# Patient Record
Sex: FEMALE | Race: WHITE | NOT HISPANIC OR LATINO | Employment: STUDENT | URBAN - METROPOLITAN AREA
[De-identification: names, ages, dates, MRNs, and addresses within clinical notes are randomized per-mention and may not be internally consistent; named-entity substitution may affect disease eponyms.]

---

## 2017-01-23 ENCOUNTER — OFFICE VISIT (OUTPATIENT)
Dept: FAMILY MEDICINE CLINIC | Facility: CLINIC | Age: 22
End: 2017-01-23

## 2017-01-23 VITALS
TEMPERATURE: 98 F | DIASTOLIC BLOOD PRESSURE: 72 MMHG | RESPIRATION RATE: 16 BRPM | OXYGEN SATURATION: 96 % | HEART RATE: 80 BPM | BODY MASS INDEX: 24.99 KG/M2 | WEIGHT: 150 LBS | SYSTOLIC BLOOD PRESSURE: 110 MMHG | HEIGHT: 65 IN

## 2017-01-23 DIAGNOSIS — F98.8 ADD (ATTENTION DEFICIT DISORDER): ICD-10-CM

## 2017-01-23 PROCEDURE — 99213 OFFICE O/P EST LOW 20 MIN: CPT | Performed by: FAMILY MEDICINE

## 2017-01-23 RX ORDER — HYDROCODONE BITARTRATE AND ACETAMINOPHEN 7.5; 325 MG/1; MG/1
TABLET ORAL
Refills: 0 | COMMUNITY
Start: 2017-01-06 | End: 2017-01-23

## 2017-01-23 RX ORDER — DEXTROAMPHETAMINE SACCHARATE, AMPHETAMINE ASPARTATE, DEXTROAMPHETAMINE SULFATE AND AMPHETAMINE SULFATE 1.25; 1.25; 1.25; 1.25 MG/1; MG/1; MG/1; MG/1
5 TABLET ORAL 2 TIMES DAILY
Qty: 60 TABLET | Refills: 0 | Status: SHIPPED | OUTPATIENT
Start: 2017-01-23 | End: 2017-03-20 | Stop reason: SDUPTHER

## 2017-01-23 RX ORDER — DEXTROAMPHETAMINE SACCHARATE, AMPHETAMINE ASPARTATE MONOHYDRATE, DEXTROAMPHETAMINE SULFATE AND AMPHETAMINE SULFATE 2.5; 2.5; 2.5; 2.5 MG/1; MG/1; MG/1; MG/1
10 CAPSULE, EXTENDED RELEASE ORAL EVERY MORNING
Qty: 30 CAPSULE | Refills: 0 | Status: CANCELLED | OUTPATIENT
Start: 2017-01-23

## 2017-01-23 RX ORDER — IBUPROFEN 600 MG/1
TABLET ORAL
Refills: 0 | COMMUNITY
Start: 2017-01-06 | End: 2017-01-23

## 2017-01-23 RX ORDER — NORGESTIMATE AND ETHINYL ESTRADIOL 7DAYSX3 LO
1 KIT ORAL
COMMUNITY
Start: 2016-05-16 | End: 2017-01-23

## 2017-01-23 RX ORDER — AMOXICILLIN 500 MG/1
CAPSULE ORAL
Refills: 0 | COMMUNITY
Start: 2017-01-06 | End: 2017-01-23

## 2017-01-23 NOTE — PROGRESS NOTES
"Subjective   Adina Trinh is a 21 y.o. female.     History of Present Illness   Chief Complaint:   Chief Complaint   Patient presents with   • LALO Trinh 21 y.o. female who presents today for Medical Management of the below listed issues and medication refills. The patient has read and signed the [facility] Controlled Substance Contract.  I will continue to see patient for regular follow up appointments.  They are well controlled on their medication.  MARISSA has been reviewed by me and is updated every 3 months. Only taking 2 classes and wants to decrease to 5mg bid. The patient is aware of the potential for addiction and dependence.    she has a history of   Patient Active Problem List   Diagnosis   • ADD (attention deficit disorder)   • Depression   • Herpes simplex of female genitalia   • Herpes simplex type 1 infection   .  Since the last visit, she has overall felt well.  she has been compliant with   Current Outpatient Prescriptions:   •  norgestimate-ethinyl estradiol (ORTHO TRI-CYCLEN LO) 0.18/0.215/0.25 MG-25 MCG per tablet, Take 1 tablet by mouth., Disp: , Rfl:   •  amoxicillin (AMOXIL) 500 MG capsule, TK ONE C PO  TID UNTIL GONE, Disp: , Rfl: 0  •  amphetamine-dextroamphetamine XR (ADDERALL XR) 10 MG 24 hr capsule, Take 1 capsule by mouth Every Morning., Disp: 30 capsule, Rfl: 0  •  HYDROcodone-acetaminophen (NORCO) 7.5-325 MG per tablet, TK 1 T PO  Q 6 HOURS PRN FOR PAIN, Disp: , Rfl: 0  •  ibuprofen (ADVIL,MOTRIN) 600 MG tablet, TK 1 T PO  Q 6 HOURS PRN FOR PAIN, Disp: , Rfl: 0  •  valACYclovir (VALTREX) 1000 MG tablet, Take 1 tablet by mouth 2 (Two) Times a Day., Disp: 60 tablet, Rfl: 0.  she denies medication side effects.    All of the chronic condition(s) listed above are stable w/o issues.    Visit Vitals   • /72   • Pulse 80   • Temp 98 °F (36.7 °C) (Oral)   • Resp 16   • Ht 65\" (165.1 cm)   • Wt 150 lb (68 kg)   • SpO2 96%   • BMI 24.96 kg/m2       Results for orders " placed or performed in visit on 02/18/16   Written Authorization   Result Value Ref Range    Written Authorization Comment    HSV 1 and 2 IgM, Abs, Indirect   Result Value Ref Range    HSV 1 IgM <1:10 <1:10 titer    HSV 2 IgM <1:10 <1:10 titer         The following portions of the patient's history were reviewed and updated as appropriate: allergies, current medications, past family history, past medical history, past social history, past surgical history and problem list.    Review of Systems   Constitutional: Negative for activity change, appetite change and unexpected weight change.   Eyes: Negative for visual disturbance.   Respiratory: Negative for chest tightness and shortness of breath.    Cardiovascular: Negative for chest pain and palpitations.   Skin: Negative for color change.   Neurological: Negative for syncope and speech difficulty.   Psychiatric/Behavioral: Negative for confusion and decreased concentration.       Objective   Physical Exam   Constitutional: She appears well-developed and well-nourished.   Eyes: Pupils are equal, round, and reactive to light.   Cardiovascular: Normal rate and regular rhythm.    Pulmonary/Chest: Effort normal and breath sounds normal.   Psychiatric: She has a normal mood and affect. Her behavior is normal. Judgment and thought content normal.   Nursing note and vitals reviewed.      Assessment/Plan   Adina was seen today for add.    Diagnoses and all orders for this visit:    ADD (attention deficit disorder)    Other orders  -     Cancel: amphetamine-dextroamphetamine XR (ADDERALL XR) 10 MG 24 hr capsule; Take 1 capsule by mouth Every Morning.  -     amphetamine-dextroamphetamine (ADDERALL) 5 MG tablet; Take 1 tablet by mouth 2 (Two) Times a Day.

## 2017-01-23 NOTE — PATIENT INSTRUCTIONS
Patients must be seen every 3 months for their presription medication review and refill required by [place] law.  Patient has been advised on the potential for addiction  and dependence to their prescribed scheduled medication.  MARISSA was obtained today and reviewed. This was scanned into the patient's chart.

## 2017-01-23 NOTE — MR AVS SNAPSHOT
Adina Trinh   1/23/2017 9:00 AM   Office Visit    Provider:  Laci Sanchez MD   Department:  [facility]   Dept Phone:  [phone]                Your Full Care Plan              Today's Medication Changes          These changes are accurate as of: 1/23/17 10:07 AM.  If you have any questions, ask your nurse or doctor.               New Medication(s)Ordered:     amphetamine-dextroamphetamine 5 MG tablet   Commonly known as:  ADDERALL   Take 1 tablet by mouth 2 (Two) Times a Day.   Replaces:  amphetamine-dextroamphetamine XR 10 MG 24 hr capsule         Stop taking medication(s)listed here:     amoxicillin 500 MG capsule   Commonly known as:  AMOXIL           amphetamine-dextroamphetamine XR 10 MG 24 hr capsule   Commonly known as:  ADDERALL XR   Replaced by:  amphetamine-dextroamphetamine 5 MG tablet           HYDROcodone-acetaminophen 7.5-325 MG per tablet   Commonly known as:  NORCO           ibuprofen 600 MG tablet   Commonly known as:  ADVIL,MOTRIN           norgestimate-ethinyl estradiol 0.18/0.215/0.25 MG-25 MCG per tablet   Commonly known as:  ORTHO TRI-CYCLEN LO                Where to Get Your Medications      You can get these medications from any pharmacy     Bring a paper prescription for each of these medications     amphetamine-dextroamphetamine 5 MG tablet                  Your Updated Medication List          This list is accurate as of: 1/23/17 10:07 AM.  Always use your most recent med list.                amphetamine-dextroamphetamine 5 MG tablet   Commonly known as:  ADDERALL   Take 1 tablet by mouth 2 (Two) Times a Day.       valACYclovir 1000 MG tablet   Commonly known as:  VALTREX   Take 1 tablet by mouth 2 (Two) Times a Day.               You Were Diagnosed With        Codes Comments    ADD (attention deficit disorder)     ICD-10-CM: F98.8  ICD-9-CM: 314.00       Instructions     Patients must be seen every 3 months for their  "presription medication review and refill required by [place] law.  Patient has been advised on the potential for addiction  and dependence to their prescribed scheduled medication.  MARISSA was obtained today and reviewed. This was scanned into the patient's chart.       Patient Instructions History      [employer] Signup     [facility] [employer] allows you to send messages to your doctor, view your test results, renew your prescriptions, schedule appointments, and more. To sign up, go to [email or web address] and click on the Sign Up Now link in the New User? box. Enter your [employer] Activation Code exactly as it appears below along with the last four digits of your Social Security Number and your Date of Birth () to complete the sign-up process. If you do not sign up before the expiration date, you must request a new code.    [employer] Activation Code: [email or web address]  Expires: 2017  5:33 AM    If you have questions, you can email [email or web address] or call [phone] to talk to our [employer] staff. Remember, [employer] is NOT to be used for urgent needs. For medical emergencies, dial 911.               Other Info from Your Visit           Allergies     No Known Allergies      Reason for Visit     ADD           Vital Signs     Blood Pressure Pulse Temperature Respirations Height Weight    110/72 80 98 °F (36.7 °C) (Oral) 16 65\" (165.1 cm) 150 lb (68 kg)    Oxygen Saturation Body Mass Index Smoking Status             96% 24.96 kg/m2 Never Smoker         Problems and Diagnoses Noted     ADD (attention deficit disorder)    Herpes simplex type 1 infection      "

## 2017-03-21 RX ORDER — DEXTROAMPHETAMINE SACCHARATE, AMPHETAMINE ASPARTATE, DEXTROAMPHETAMINE SULFATE AND AMPHETAMINE SULFATE 1.25; 1.25; 1.25; 1.25 MG/1; MG/1; MG/1; MG/1
5 TABLET ORAL 2 TIMES DAILY
Qty: 60 TABLET | Refills: 0 | Status: SHIPPED | OUTPATIENT
Start: 2017-03-21 | End: 2017-09-13

## 2017-05-15 ENCOUNTER — OFFICE VISIT (OUTPATIENT)
Dept: FAMILY MEDICINE CLINIC | Facility: CLINIC | Age: 22
End: 2017-05-15

## 2017-05-15 VITALS
OXYGEN SATURATION: 95 % | RESPIRATION RATE: 16 BRPM | HEIGHT: 65 IN | WEIGHT: 138 LBS | HEART RATE: 81 BPM | DIASTOLIC BLOOD PRESSURE: 58 MMHG | BODY MASS INDEX: 22.99 KG/M2 | TEMPERATURE: 98.4 F | SYSTOLIC BLOOD PRESSURE: 98 MMHG

## 2017-05-15 DIAGNOSIS — F98.8 ADD (ATTENTION DEFICIT DISORDER): ICD-10-CM

## 2017-05-15 DIAGNOSIS — F32.A DEPRESSION, UNSPECIFIED DEPRESSION TYPE: ICD-10-CM

## 2017-05-15 DIAGNOSIS — F41.9 ANXIETY: Primary | ICD-10-CM

## 2017-05-15 DIAGNOSIS — J01.90 ACUTE NON-RECURRENT SINUSITIS, UNSPECIFIED LOCATION: ICD-10-CM

## 2017-05-15 PROCEDURE — 99214 OFFICE O/P EST MOD 30 MIN: CPT | Performed by: FAMILY MEDICINE

## 2017-05-15 RX ORDER — BUSPIRONE HYDROCHLORIDE 5 MG/1
5 TABLET ORAL 3 TIMES DAILY
Qty: 60 TABLET | Refills: 0 | Status: SHIPPED | OUTPATIENT
Start: 2017-05-15 | End: 2017-09-13

## 2017-05-15 RX ORDER — AZITHROMYCIN 250 MG/1
TABLET, FILM COATED ORAL
Qty: 6 TABLET | Refills: 0 | Status: SHIPPED | OUTPATIENT
Start: 2017-05-15 | End: 2017-09-13

## 2017-05-15 RX ORDER — DEXTROAMPHETAMINE SACCHARATE, AMPHETAMINE ASPARTATE, DEXTROAMPHETAMINE SULFATE AND AMPHETAMINE SULFATE 1.25; 1.25; 1.25; 1.25 MG/1; MG/1; MG/1; MG/1
5 TABLET ORAL 2 TIMES DAILY
Qty: 60 TABLET | Refills: 0 | Status: CANCELLED | OUTPATIENT
Start: 2017-05-15

## 2017-09-13 ENCOUNTER — OFFICE VISIT (OUTPATIENT)
Dept: FAMILY MEDICINE CLINIC | Facility: CLINIC | Age: 22
End: 2017-09-13

## 2017-09-13 VITALS
HEIGHT: 65 IN | OXYGEN SATURATION: 100 % | DIASTOLIC BLOOD PRESSURE: 66 MMHG | RESPIRATION RATE: 16 BRPM | SYSTOLIC BLOOD PRESSURE: 105 MMHG | HEART RATE: 86 BPM | TEMPERATURE: 98.7 F | BODY MASS INDEX: 24.49 KG/M2 | WEIGHT: 147 LBS

## 2017-09-13 DIAGNOSIS — S09.90XA HEAD INJURY, INITIAL ENCOUNTER: ICD-10-CM

## 2017-09-13 DIAGNOSIS — S89.92XA LEFT KNEE INJURY, INITIAL ENCOUNTER: ICD-10-CM
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
## 2017-09-13 DIAGNOSIS — Z48.02 VISIT FOR SUTURE REMOVAL: ICD-10-CM

## 2017-09-13 PROCEDURE — 99215 OFFICE O/P EST HI 40 MIN: CPT | Performed by: FAMILY MEDICINE

## 2017-09-13 NOTE — PATIENT INSTRUCTIONS
Exercise 30 minutes most days of the week  Sleep 6-8 hours each night if possible  Low fat, low cholesterol diet   we discussed prescribed medications and how to take them   make sure you get results of any labs/studies ordered today  Low glycemic index diet   SEE ME Friday AND WILL XRAY LEFT KNEE  WILL REFER TO PLASTIC SURGEON. ABOUT LACERATION FOREHEAD
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
## 2017-09-15 ENCOUNTER — OFFICE VISIT (OUTPATIENT)
Dept: FAMILY MEDICINE CLINIC | Facility: CLINIC | Age: 22
End: 2017-09-15

## 2017-09-15 VITALS
RESPIRATION RATE: 16 BRPM | HEART RATE: 87 BPM | TEMPERATURE: 96.6 F | OXYGEN SATURATION: 100 % | BODY MASS INDEX: 24.49 KG/M2 | HEIGHT: 65 IN | DIASTOLIC BLOOD PRESSURE: 62 MMHG | SYSTOLIC BLOOD PRESSURE: 101 MMHG | WEIGHT: 147 LBS

## 2017-09-15 DIAGNOSIS — Z51.89 ENCOUNTER FOR WOUND RE-CHECK: ICD-10-CM
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
## 2017-09-15 PROCEDURE — 73560 X-RAY EXAM OF KNEE 1 OR 2: CPT | Performed by: FAMILY MEDICINE

## 2017-09-15 PROCEDURE — 99213 OFFICE O/P EST LOW 20 MIN: CPT | Performed by: FAMILY MEDICINE
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
## 2017-11-21 ENCOUNTER — OFFICE VISIT (OUTPATIENT)
Dept: FAMILY MEDICINE CLINIC | Facility: CLINIC | Age: 22
End: 2017-11-21

## 2017-11-21 VITALS
BODY MASS INDEX: 26.66 KG/M2 | SYSTOLIC BLOOD PRESSURE: 103 MMHG | DIASTOLIC BLOOD PRESSURE: 56 MMHG | WEIGHT: 160 LBS | OXYGEN SATURATION: 100 % | HEART RATE: 87 BPM | TEMPERATURE: 99.9 F | RESPIRATION RATE: 16 BRPM | HEIGHT: 65 IN

## 2017-11-21 DIAGNOSIS — F98.8 ATTENTION DEFICIT DISORDER, UNSPECIFIED HYPERACTIVITY PRESENCE: Primary | ICD-10-CM

## 2017-11-21 PROCEDURE — 99213 OFFICE O/P EST LOW 20 MIN: CPT | Performed by: FAMILY MEDICINE

## 2017-11-21 RX ORDER — DEXTROAMPHETAMINE SACCHARATE, AMPHETAMINE ASPARTATE MONOHYDRATE, DEXTROAMPHETAMINE SULFATE AND AMPHETAMINE SULFATE 3.75; 3.75; 3.75; 3.75 MG/1; MG/1; MG/1; MG/1
15 CAPSULE, EXTENDED RELEASE ORAL EVERY MORNING
Qty: 30 CAPSULE | Refills: 0 | Status: SHIPPED | OUTPATIENT
Start: 2017-11-21 | End: 2018-01-05 | Stop reason: SDUPTHER

## 2017-11-21 NOTE — PROGRESS NOTES
"Subjective   Adina Trinh is a 22 y.o. female.     History of Present Illness   Chief Complaint:   Chief Complaint   Patient presents with   • ADD       Adina Trinh 22 y.o. female who presents today to discuss Medical Management for ADD. She would like to be started back on Adderall for her ADD. She stated that she is going to be starting a full time job.  The patient has read and signed the [facility] Controlled Substance Contract.  I will continue to see patient for regular follow up appointments.  They are well controlled on their medication.  MARISSA has been reviewed by me and is updated every 3 months. The patient is aware of the potential for addiction and dependence.     she has a problem list of   Patient Active Problem List   Diagnosis   • ADD (attention deficit disorder)   • Depression   • Herpes simplex of female genitalia   • Herpes simplex type 1 infection   .  Since the last visit, she has overall felt well.  she has been compliant with   Current Outpatient Prescriptions:   •  NAPROXEN  MG EC tablet, TK 1 T PO  BID, Disp: , Rfl: 0.  she denies medication side effects.    All of the chronic condition(s) listed above are stable w/o issues.    /56  Pulse 87  Temp 99.9 °F (37.7 °C) (Oral)   Resp 16  Ht 65\" (165.1 cm)  Wt 160 lb (72.6 kg)  SpO2 100%  BMI 26.63 kg/m2    The following portions of the patient's history were reviewed and updated as appropriate: allergies, current medications, past family history, past medical history, past social history, past surgical history and problem list.    Review of Systems   Constitutional: Negative for activity change, appetite change and unexpected weight change.   Eyes: Negative for visual disturbance.   Respiratory: Negative for chest tightness and shortness of breath.    Cardiovascular: Negative for chest pain and palpitations.   Skin: Negative for color change.   Neurological: Negative for syncope and speech difficulty. "   Psychiatric/Behavioral: Negative for confusion and decreased concentration.       Objective   Physical Exam   Constitutional: She is oriented to person, place, and time. She appears well-developed and well-nourished.   HENT:   Right Ear: External ear normal.   Left Ear: External ear normal.   Mouth/Throat: Oropharynx is clear and moist.   Eyes: Pupils are equal, round, and reactive to light.   Cardiovascular: Normal rate and regular rhythm.    Pulmonary/Chest: Effort normal and breath sounds normal.   Abdominal: Soft. Bowel sounds are normal.   Neurological: She is alert and oriented to person, place, and time.   Psychiatric: She has a normal mood and affect. Her behavior is normal.   Nursing note and vitals reviewed.      Assessment/Plan   Adina was seen today for add.    Diagnoses and all orders for this visit:    Attention deficit disorder, unspecified hyperactivity presence    Other orders  -     amphetamine-dextroamphetamine XR (ADDERALL XR) 15 MG 24 hr capsule; Take 1 capsule by mouth Every Morning.

## 2018-01-05 ENCOUNTER — OFFICE VISIT (OUTPATIENT)
Dept: FAMILY MEDICINE CLINIC | Facility: CLINIC | Age: 23
End: 2018-01-05

## 2018-01-05 VITALS
TEMPERATURE: 98.1 F | HEIGHT: 65 IN | HEART RATE: 97 BPM | SYSTOLIC BLOOD PRESSURE: 104 MMHG | BODY MASS INDEX: 26.82 KG/M2 | RESPIRATION RATE: 16 BRPM | OXYGEN SATURATION: 99 % | DIASTOLIC BLOOD PRESSURE: 66 MMHG | WEIGHT: 161 LBS

## 2018-01-05 DIAGNOSIS — F98.8 ATTENTION DEFICIT DISORDER, UNSPECIFIED HYPERACTIVITY PRESENCE: ICD-10-CM

## 2018-01-05 DIAGNOSIS — J06.9 ACUTE URI: Primary | ICD-10-CM

## 2018-01-05 PROCEDURE — 99213 OFFICE O/P EST LOW 20 MIN: CPT | Performed by: FAMILY MEDICINE

## 2018-01-05 RX ORDER — DEXTROAMPHETAMINE SACCHARATE, AMPHETAMINE ASPARTATE MONOHYDRATE, DEXTROAMPHETAMINE SULFATE AND AMPHETAMINE SULFATE 3.75; 3.75; 3.75; 3.75 MG/1; MG/1; MG/1; MG/1
15 CAPSULE, EXTENDED RELEASE ORAL EVERY MORNING
Qty: 30 CAPSULE | Refills: 0 | Status: SHIPPED | OUTPATIENT
Start: 2018-01-05 | End: 2018-02-19 | Stop reason: SDUPTHER

## 2018-01-05 NOTE — PATIENT INSTRUCTIONS
Rest  Increase fluids   RTC prn or 1 week if not better   Take mucinex DM  Take Zyrtec or Claritin   OTC cough med prn      Ibuprofen for pain and fever control                                                        Tylenol(acetominophen) for pain and fever control    Saline nasal spray 2 sprays to each nostril 6 times daily   Patients must be seen every 3 months for their presription medication review and refill required by [place] law.  Patient has been advised on the potential for addiction  and dependence to their prescribed scheduled medication.  MARISSA was obtained today and reviewed. This was scanned into the patient's chart.

## 2018-01-05 NOTE — PROGRESS NOTES
"Subjective   Adina Trinh is a 22 y.o. female.     History of Present Illness   Chief Complaint:   Chief Complaint   Patient presents with   • URI   • ADD     second script     Adina Trinh 22 y.o. female who presents for evaluation of upper respiratory congestion. Symptoms include sore throat, post nasal drip and productive cough.  Onset of symptoms was 2 weeks ago, gradually improving since that time. Patient denies shortness of breath.   Evaluation to date: was seen at the [facility] to date:  antibiotics from urgent care. She received her second RX for Adderall. The patient has read and signed the [facility] Controlled Substance Contract.  I will continue to see patient for regular follow up appointments.  They are well controlled on their medication.  MARISSA has been reviewed by me and is updated every 3 months. The patient is aware of the potential for addiction and dependence.      .  she has a problem list of   Patient Active Problem List   Diagnosis   • ADD (attention deficit disorder)   • Depression   • Herpes simplex of female genitalia   • Herpes simplex type 1 infection   .  Since the last visit, she has overall felt well.  she has been compliant with   Current Outpatient Prescriptions:   •  amphetamine-dextroamphetamine XR (ADDERALL XR) 15 MG 24 hr capsule, Take 1 capsule by mouth Every Morning., Disp: 30 capsule, Rfl: 0.  she denies medication side effects.    All of the chronic condition(s) listed above are stable w/o issues.    /66  Pulse 97  Temp 98.1 °F (36.7 °C) (Oral)   Resp 16  Ht 165.1 cm (65\")  Wt 73 kg (161 lb)  SpO2 99%  BMI 26.79 kg/m2    The following portions of the patient's history were reviewed and updated as appropriate: allergies, current medications, past family history, past medical history, past social history, past surgical history and problem list.    Review of Systems   Constitutional: Negative for activity change, appetite change and " unexpected weight change.   HENT: Positive for postnasal drip and sore throat.    Eyes: Negative for visual disturbance.   Respiratory: Positive for cough. Negative for chest tightness and shortness of breath.    Cardiovascular: Negative for chest pain and palpitations.   Skin: Negative for color change.   Neurological: Negative for syncope and speech difficulty.   Psychiatric/Behavioral: Negative for confusion and decreased concentration.       Objective   Physical Exam   Constitutional: She appears well-developed and well-nourished.   HENT:   Head: Normocephalic.   Right Ear: External ear normal.   Left Ear: External ear normal.   Mouth/Throat: Oropharynx is clear and moist.   Eyes: EOM are normal. Pupils are equal, round, and reactive to light.   Neck: Normal range of motion. No thyromegaly present.   Cardiovascular: Normal rate and regular rhythm.    Pulmonary/Chest: Effort normal and breath sounds normal.   Psychiatric: She has a normal mood and affect. Her behavior is normal.   Nursing note reviewed.      Assessment/Plan   Problems Addressed this Visit        Other    ADD (attention deficit disorder)    Relevant Medications    amphetamine-dextroamphetamine XR (ADDERALL XR) 15 MG 24 hr capsule      Other Visit Diagnoses     Acute URI    -  Primary

## 2018-02-19 DIAGNOSIS — F98.8 ATTENTION DEFICIT DISORDER, UNSPECIFIED HYPERACTIVITY PRESENCE: ICD-10-CM

## 2018-02-19 RX ORDER — DEXTROAMPHETAMINE SACCHARATE, AMPHETAMINE ASPARTATE MONOHYDRATE, DEXTROAMPHETAMINE SULFATE AND AMPHETAMINE SULFATE 3.75; 3.75; 3.75; 3.75 MG/1; MG/1; MG/1; MG/1
15 CAPSULE, EXTENDED RELEASE ORAL EVERY MORNING
Qty: 30 CAPSULE | Refills: 0 | Status: SHIPPED | OUTPATIENT
Start: 2018-02-19 | End: 2018-03-27 | Stop reason: SDUPTHER

## 2018-02-27 ENCOUNTER — OFFICE VISIT (OUTPATIENT)
Dept: FAMILY MEDICINE CLINIC | Facility: CLINIC | Age: 23
End: 2018-02-27

## 2018-02-27 VITALS
RESPIRATION RATE: 16 BRPM | DIASTOLIC BLOOD PRESSURE: 74 MMHG | OXYGEN SATURATION: 99 % | BODY MASS INDEX: 25.99 KG/M2 | SYSTOLIC BLOOD PRESSURE: 115 MMHG | TEMPERATURE: 97.4 F | HEIGHT: 65 IN | WEIGHT: 156 LBS | HEART RATE: 86 BPM

## 2018-02-27 DIAGNOSIS — F98.8 ATTENTION DEFICIT DISORDER, UNSPECIFIED HYPERACTIVITY PRESENCE: ICD-10-CM

## 2018-02-27 PROCEDURE — 99214 OFFICE O/P EST MOD 30 MIN: CPT | Performed by: FAMILY MEDICINE

## 2018-02-27 RX ORDER — DEXTROAMPHETAMINE SACCHARATE, AMPHETAMINE ASPARTATE MONOHYDRATE, DEXTROAMPHETAMINE SULFATE AND AMPHETAMINE SULFATE 3.75; 3.75; 3.75; 3.75 MG/1; MG/1; MG/1; MG/1
15 CAPSULE, EXTENDED RELEASE ORAL EVERY MORNING
Qty: 30 CAPSULE | Refills: 0 | Status: CANCELLED | OUTPATIENT
Start: 2018-02-27

## 2018-02-27 NOTE — PATIENT INSTRUCTIONS
Exercise 30 minutes most days of the week  Sleep 6-8 hours each night if possible  Low fat, low cholesterol diet   we discussed prescribed medications and how to take them   make sure you get results of any labs/studies ordered today  Low glycemic index diet     Refer to dr polk  consult

## 2018-02-27 NOTE — PROGRESS NOTES
"Subjective   Adina Trinh is a 22 y.o. female.     History of Present Illness   Chief Complaint:   Chief Complaint   Patient presents with   • LALO Trinh 22 y.o. female who presents today for Medical Management of the below listed issues and medication refills. The patient has read and signed the [facility] Controlled Substance Contract.  I will continue to see patient for regular follow up appointments.  They are well controlled on their medication.  MARISSA has been reviewed by me and is updated every 3 months. The patient is aware of the potential for addiction and dependence. Already got the first of the 3 months refilled. Will call back for next 2 months,  Seeing  Counselor and they talked about  Rejection sensitive dysphoria and [facility]  And referred to dr herzog and group.    she has a problem list of   Patient Active Problem List   Diagnosis   • ADD (attention deficit disorder)   • Depression   • Herpes simplex of female genitalia   • Herpes simplex type 1 infection   • ERRONEOUS ENCOUNTER--DISREGARD   .  Since the last visit, she has overall felt well.  she has been compliant with   Current Outpatient Prescriptions:   •  amphetamine-dextroamphetamine XR (ADDERALL XR) 15 MG 24 hr capsule, Take 1 capsule by mouth Every Morning Earliest Fill Date: 2/19/18, Disp: 30 capsule, Rfl: 0.  she denies medication side effects.    All of the chronic condition(s) listed above are stable w/o issues.    /74  Pulse 86  Temp 97.4 °F (36.3 °C) (Oral)   Resp 16  Ht 165.1 cm (65\")  Wt 70.8 kg (156 lb)  SpO2 99%  BMI 25.96 kg/m2      The following portions of the patient's history were reviewed and updated as appropriate: allergies, current medications, past family history, past medical history, past social history, past surgical history and problem list.    Review of Systems   Constitutional: Negative for activity change, appetite change, chills, fatigue, fever and " unexpected weight change.   HENT: Negative for congestion, ear pain, hearing loss, nosebleeds, rhinorrhea and sore throat.    Eyes: Negative for pain, redness and visual disturbance.   Respiratory: Negative for cough, shortness of breath and wheezing.    Cardiovascular: Negative for chest pain, palpitations and leg swelling.   Gastrointestinal: Negative for abdominal pain, blood in stool, constipation, diarrhea, nausea and vomiting.   Endocrine: Negative for cold intolerance and heat intolerance.   Genitourinary: Negative for difficulty urinating, dysuria, frequency, hematuria, pelvic pain, urgency and vaginal discharge.   Musculoskeletal: Negative for arthralgias, back pain and joint swelling.   Skin: Negative for rash and wound.   Neurological: Negative for dizziness, weakness, numbness and headaches.   Hematological: Does not bruise/bleed easily.   Psychiatric/Behavioral: Negative for dysphoric mood, sleep disturbance and suicidal ideas. The patient is not nervous/anxious.        Objective   Physical Exam   Constitutional: She is oriented to person, place, and time. She appears well-developed and well-nourished.   HENT:   Head: Normocephalic.   Eyes: Conjunctivae and EOM are normal. Pupils are equal, round, and reactive to light.   Neck: Normal range of motion. No thyromegaly present.   Cardiovascular: Normal rate and regular rhythm.    Pulmonary/Chest: Effort normal and breath sounds normal.   Abdominal: Soft. Bowel sounds are normal.   Musculoskeletal: Normal range of motion.   Neurological: She is oriented to person, place, and time.   Skin: Skin is warm and dry.   Psychiatric: She has a normal mood and affect. Her behavior is normal.   Nursing note and vitals reviewed.      Assessment/Plan   Adina was seen today for add.    Diagnoses and all orders for this visit:    Attention deficit disorder, unspecified hyperactivity presence  -     Ambulatory Referral to Psychiatry    Other orders  -     Cancel:  amphetamine-dextroamphetamine XR (ADDERALL XR) 15 MG 24 hr capsule; Take 1 capsule by mouth Every Morning

## 2018-03-27 DIAGNOSIS — F98.8 ATTENTION DEFICIT DISORDER, UNSPECIFIED HYPERACTIVITY PRESENCE: ICD-10-CM

## 2018-03-27 RX ORDER — DEXTROAMPHETAMINE SACCHARATE, AMPHETAMINE ASPARTATE MONOHYDRATE, DEXTROAMPHETAMINE SULFATE AND AMPHETAMINE SULFATE 3.75; 3.75; 3.75; 3.75 MG/1; MG/1; MG/1; MG/1
15 CAPSULE, EXTENDED RELEASE ORAL EVERY MORNING
Qty: 30 CAPSULE | Refills: 0 | Status: SHIPPED | OUTPATIENT
Start: 2018-03-27 | End: 2018-05-09 | Stop reason: DRUGHIGH

## 2018-05-09 ENCOUNTER — OFFICE VISIT (OUTPATIENT)
Dept: FAMILY MEDICINE CLINIC | Facility: CLINIC | Age: 23
End: 2018-05-09

## 2018-05-09 VITALS
RESPIRATION RATE: 16 BRPM | OXYGEN SATURATION: 98 % | DIASTOLIC BLOOD PRESSURE: 69 MMHG | SYSTOLIC BLOOD PRESSURE: 127 MMHG | BODY MASS INDEX: 25.83 KG/M2 | HEART RATE: 79 BPM | TEMPERATURE: 98.5 F | HEIGHT: 65 IN | WEIGHT: 155 LBS

## 2018-05-09 DIAGNOSIS — F98.8 ATTENTION DEFICIT DISORDER, UNSPECIFIED HYPERACTIVITY PRESENCE: Primary | ICD-10-CM

## 2018-05-09 PROCEDURE — 99213 OFFICE O/P EST LOW 20 MIN: CPT | Performed by: FAMILY MEDICINE

## 2018-05-09 RX ORDER — DEXTROAMPHETAMINE SACCHARATE, AMPHETAMINE ASPARTATE, DEXTROAMPHETAMINE SULFATE AND AMPHETAMINE SULFATE 1.25; 1.25; 1.25; 1.25 MG/1; MG/1; MG/1; MG/1
5 TABLET ORAL 2 TIMES DAILY
Qty: 60 TABLET | Refills: 0 | Status: SHIPPED | OUTPATIENT
Start: 2018-05-09 | End: 2018-06-25

## 2018-05-09 NOTE — PATIENT INSTRUCTIONS
Patients must be seen every 3 months for their presription medication review and refill required by [place] law.  Patient has been advised on the potential for addiction  and dependence to their prescribed scheduled medication.  MARISSA was obtained today and reviewed. This was scanned into the patient's chart.  Call in 1 month with report

## 2018-05-09 NOTE — PROGRESS NOTES
"Subjective   Adina Trinh is a 23 y.o. female.     History of Present Illness   Chief Complaint:   Chief Complaint   Patient presents with   • ADD   • Medication Problem       Adina Trinh 23 y.o. female who presents today for Medical Management of the below listed issues and medication refills. She stated that she has been vomiting off her current prescription of Adderall. She would like to try a lower dosage. she has a problem list of  On adderall  xr 15mg daily and having side effects  And would like to lower to adderall 5mg bid. Call if any problems.  Patient Active Problem List   Diagnosis   • ADD (attention deficit disorder)   • Depression   • Herpes simplex of female genitalia   • Herpes simplex type 1 infection   • ERRONEOUS ENCOUNTER--DISREGARD   .  Since the last visit, she has overall felt well.  she has been compliant with   Current Outpatient Prescriptions:   •  amphetamine-dextroamphetamine XR (ADDERALL XR) 15 MG 24 hr capsule, Take 1 capsule by mouth Every Morning Earliest Fill Date: 3/27/18, Disp: 30 capsule, Rfl: 0.  she denies medication side effects.    All of the chronic condition(s) listed above are stable w/o issues.    /69   Pulse 79   Temp 98.5 °F (36.9 °C) (Oral)   Resp 16   Ht 165.1 cm (65\")   Wt 70.3 kg (155 lb)   SpO2 98%   BMI 25.79 kg/m²     The following portions of the patient's history were reviewed and updated as appropriate: allergies, current medications, past family history, past medical history, past social history, past surgical history and problem list.    Review of Systems   Constitutional: Negative for activity change, appetite change, fatigue and unexpected weight change.   HENT: Negative for congestion.    Eyes: Negative for visual disturbance.   Respiratory: Negative for chest tightness and shortness of breath.    Cardiovascular: Negative for chest pain and palpitations.   Gastrointestinal: Negative for abdominal pain and constipation.   Endocrine: " Negative for cold intolerance, heat intolerance, polydipsia, polyphagia and polyuria.   Genitourinary: Negative for difficulty urinating, dysuria and menstrual problem.   Musculoskeletal: Negative for arthralgias.   Skin: Negative for rash.   Neurological: Negative for headaches.   Psychiatric/Behavioral: Negative for behavioral problems, dysphoric mood and sleep disturbance. The patient is not nervous/anxious.        Objective   Physical Exam   Constitutional: She appears well-developed and well-nourished.   Eyes: Pupils are equal, round, and reactive to light.   Neck: Normal range of motion. Neck supple.   Cardiovascular: Normal rate and regular rhythm.    Pulmonary/Chest: Effort normal and breath sounds normal.   Abdominal: Soft.   Musculoskeletal: Normal range of motion.   Skin: Skin is warm.   Psychiatric: She has a normal mood and affect.   Nursing note and vitals reviewed.      Assessment/Plan   Adina was seen today for add and medication problem.    Diagnoses and all orders for this visit:    Attention deficit disorder, unspecified hyperactivity presence    Other orders  -     amphetamine-dextroamphetamine (ADDERALL) 5 MG tablet; Take 1 tablet by mouth 2 (Two) Times a Day.

## 2018-06-25 ENCOUNTER — OFFICE VISIT (OUTPATIENT)
Dept: FAMILY MEDICINE CLINIC | Facility: CLINIC | Age: 23
End: 2018-06-25

## 2018-06-25 VITALS
RESPIRATION RATE: 16 BRPM | BODY MASS INDEX: 23.99 KG/M2 | WEIGHT: 144 LBS | DIASTOLIC BLOOD PRESSURE: 80 MMHG | HEIGHT: 65 IN | TEMPERATURE: 98.2 F | SYSTOLIC BLOOD PRESSURE: 120 MMHG | HEART RATE: 74 BPM

## 2018-06-25 DIAGNOSIS — F90.2 ATTENTION DEFICIT HYPERACTIVITY DISORDER (ADHD), COMBINED TYPE: Primary | ICD-10-CM

## 2018-06-25 PROCEDURE — 99213 OFFICE O/P EST LOW 20 MIN: CPT | Performed by: FAMILY MEDICINE

## 2018-06-25 RX ORDER — DEXTROAMPHETAMINE SACCHARATE, AMPHETAMINE ASPARTATE, DEXTROAMPHETAMINE SULFATE AND AMPHETAMINE SULFATE 1.25; 1.25; 1.25; 1.25 MG/1; MG/1; MG/1; MG/1
5 TABLET ORAL 3 TIMES DAILY
Qty: 90 TABLET | Refills: 0 | Status: SHIPPED | OUTPATIENT
Start: 2018-06-25 | End: 2018-08-01 | Stop reason: SDUPTHER

## 2018-06-25 NOTE — PROGRESS NOTES
"Subjective   Adina Trinh is a 23 y.o. female.     History of Present Illness   Chief Complaint: presents to office today to discuss Adderall and possibly adding a second medication. She was on adderall 5mg bid and feels like she needs more adderall in am  Ask questions about rejection sensitivity dysphoria and add  Refereed to psych  Will increase adderall to 5mg  2 q am and 1 afternoon.  Refer to psych  Chief Complaint   Patient presents with   • Med Management     wants to discuss Adderrall & other med that she may be able to take with it       Adina Trinh 23 y.o. female who presents today for Medical Management of the below listed issues and medication refills.  she has a problem list of   Patient Active Problem List   Diagnosis   • ADD (attention deficit disorder)   • Depression   • Herpes simplex of female genitalia   • Herpes simplex type 1 infection   • ERRONEOUS ENCOUNTER--DISREGARD   .  Since the last visit, she has overall felt well.  she has been compliant with   Current Outpatient Prescriptions:   •  amphetamine-dextroamphetamine (ADDERALL) 5 MG tablet, Take 1 tablet by mouth 2 (Two) Times a Day., Disp: 60 tablet, Rfl: 0.  she denies medication side effects.    All of the chronic condition(s) listed above are stable w/o issues.    /80   Pulse 74   Temp 98.2 °F (36.8 °C)   Resp 16   Ht 165.1 cm (65\")   Wt 65.3 kg (144 lb)   BMI 23.96 kg/m²             The following portions of the patient's history were reviewed and updated as appropriate: allergies, current medications, past family history, past medical history, past social history, past surgical history and problem list.    Review of Systems   Constitutional: Negative.    HENT: Negative.    Eyes: Negative.    Respiratory: Negative.    Cardiovascular: Negative.    Gastrointestinal: Negative.    Endocrine: Negative.    Genitourinary: Negative.    Musculoskeletal: Negative.    Psychiatric/Behavioral: Negative for behavioral problems, " confusion, hallucinations, self-injury and suicidal ideas. The patient is not nervous/anxious.         Sometimes feels  Feeling of being overwhelmed       Objective   Physical Exam   Constitutional: She is oriented to person, place, and time.   Eyes: Pupils are equal, round, and reactive to light.   Cardiovascular: Normal rate and regular rhythm.    Pulmonary/Chest: Effort normal and breath sounds normal.   Neurological: She is alert and oriented to person, place, and time.   Skin: Skin is warm and dry.   Psychiatric: She has a normal mood and affect. Her behavior is normal.       Assessment/Plan   Adina was seen today for med management.    Diagnoses and all orders for this visit:    Attention deficit hyperactivity disorder (ADHD), combined type  -     Ambulatory Referral to Psychiatry    Other orders  -     amphetamine-dextroamphetamine (ADDERALL) 5 MG tablet; Take 1 tablet by mouth 3 (Three) Times a Day.

## 2018-06-25 NOTE — PATIENT INSTRUCTIONS
Exercise 30 minutes most days of the week  Sleep 6-8 hours each night if possible  Low fat, low cholesterol diet   we discussed prescribed medications and how to take them   make sure you get results of any labs/studies ordered today  Low glycemic index diet     See dr polk

## 2018-08-01 RX ORDER — DEXTROAMPHETAMINE SACCHARATE, AMPHETAMINE ASPARTATE, DEXTROAMPHETAMINE SULFATE AND AMPHETAMINE SULFATE 1.25; 1.25; 1.25; 1.25 MG/1; MG/1; MG/1; MG/1
TABLET ORAL
Qty: 90 TABLET | Refills: 0 | Status: SHIPPED | OUTPATIENT
Start: 2018-08-01 | End: 2018-08-02 | Stop reason: SDUPTHER

## 2018-08-02 RX ORDER — DEXTROAMPHETAMINE SACCHARATE, AMPHETAMINE ASPARTATE, DEXTROAMPHETAMINE SULFATE AND AMPHETAMINE SULFATE 1.25; 1.25; 1.25; 1.25 MG/1; MG/1; MG/1; MG/1
TABLET ORAL
Qty: 90 TABLET | Refills: 0 | Status: SHIPPED | OUTPATIENT
Start: 2018-08-02 | End: 2018-08-31 | Stop reason: SDUPTHER

## 2018-09-02 RX ORDER — DEXTROAMPHETAMINE SACCHARATE, AMPHETAMINE ASPARTATE, DEXTROAMPHETAMINE SULFATE AND AMPHETAMINE SULFATE 1.25; 1.25; 1.25; 1.25 MG/1; MG/1; MG/1; MG/1
TABLET ORAL
Qty: 90 TABLET | Refills: 0 | Status: SHIPPED | OUTPATIENT
Start: 2018-09-02 | End: 2018-09-10

## 2018-09-10 ENCOUNTER — OFFICE VISIT (OUTPATIENT)
Dept: FAMILY MEDICINE CLINIC | Facility: CLINIC | Age: 23
End: 2018-09-10

## 2018-09-10 VITALS
HEART RATE: 81 BPM | DIASTOLIC BLOOD PRESSURE: 86 MMHG | RESPIRATION RATE: 16 BRPM | WEIGHT: 143 LBS | OXYGEN SATURATION: 98 % | TEMPERATURE: 97.8 F | HEIGHT: 65 IN | BODY MASS INDEX: 23.82 KG/M2 | SYSTOLIC BLOOD PRESSURE: 130 MMHG

## 2018-09-10 DIAGNOSIS — F98.8 ATTENTION DEFICIT DISORDER, UNSPECIFIED HYPERACTIVITY PRESENCE: Primary | ICD-10-CM

## 2018-09-10 PROCEDURE — 99214 OFFICE O/P EST MOD 30 MIN: CPT | Performed by: FAMILY MEDICINE

## 2018-09-10 RX ORDER — DEXTROAMPHETAMINE SACCHARATE, AMPHETAMINE ASPARTATE, DEXTROAMPHETAMINE SULFATE AND AMPHETAMINE SULFATE 1.25; 1.25; 1.25; 1.25 MG/1; MG/1; MG/1; MG/1
TABLET ORAL
Qty: 90 TABLET | Refills: 0 | Status: CANCELLED | OUTPATIENT
Start: 2018-09-10

## 2018-09-10 RX ORDER — DEXTROAMPHETAMINE SACCHARATE, AMPHETAMINE ASPARTATE MONOHYDRATE, DEXTROAMPHETAMINE SULFATE AND AMPHETAMINE SULFATE 3.75; 3.75; 3.75; 3.75 MG/1; MG/1; MG/1; MG/1
15 CAPSULE, EXTENDED RELEASE ORAL EVERY MORNING
Qty: 30 CAPSULE | Refills: 0 | Status: SHIPPED | OUTPATIENT
Start: 2018-09-10 | End: 2018-10-29 | Stop reason: DRUGHIGH

## 2018-09-10 NOTE — PATIENT INSTRUCTIONS
Exercise 30 minutes most days of the week  Sleep 6-8 hours each night if possible  Low fat, low cholesterol diet   we discussed prescribed medications and how to take them   make sure you get results of any labs/studies ordered today  Low glycemic index diet   ADDSEE ME 1 MONTHERAL XR 15MG IN AM   AND USE  ADDERALL 5MG AT EVENING IF NEEDED

## 2018-09-10 NOTE — PROGRESS NOTES
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  At present   On regular adderall 5mg  2 po am and 5mg at 6 pm. Needs  Stronger during day.          Adina Trinh 23 y.o. female who presents today for Medical Management of the below listed issues and medication refills   ADD    WILL CHANGE TO  ADDERALL XR  15MG IN AM  AND REGULAR ADDERALL 5 MG AT 6PM, TRIAL 1 MONTH.     she has a problem list of   Patient Active Problem List   Diagnosis   • ADD (attention deficit disorder)   • Depression   • Herpes simplex of female genitalia   • Herpes simplex type 1 infection   • ERRONEOUS ENCOUNTER--DISREGARD   .  Since the last visit, she has overall felt well.  she has been compliant with   Current Outpatient Prescriptions:   •  amphetamine-dextroamphetamine XR (ADDERALL XR) 15 MG 24 hr capsule, Take 1 capsule by mouth Every Morning, Disp: 30 capsule, Rfl: 0.  she denies medication side effects.    All of the chronic condition(s) listed above are stable w/o issues.    /86 (BP Location: Right arm)   Pulse 81   Temp 97.8 °F (36.6 °C)   Resp 16   Ht 165.1 cm (65\")   Wt 64.9 kg (143 lb)   SpO2 98%   BMI 23.80 kg/m²     Results for orders placed or performed in visit on 02/18/16   Written Authorization   Result Value Ref Range    Written Authorization Comment    HSV 1 and 2 IgM, Abs, Indirect   Result Value Ref Range    HSV 1 IgM <1:10 <1:10 titer    HSV 2 IgM <1:10 <1:10 titer             The following portions of the patient's history were reviewed and updated as appropriate: allergies, current medications, past family history, past medical history, past social history, past surgical history and problem list.    Review of Systems   Constitutional: Negative for fatigue and unexpected weight change.   HENT: Negative for sinus pain.    Eyes: Negative " for visual disturbance.   Respiratory: Negative for chest tightness.    Cardiovascular: Negative for chest pain.   Gastrointestinal: Negative for abdominal pain.   Musculoskeletal: Negative for arthralgias and back pain.   Skin: Negative for rash.   Neurological: Negative for dizziness.   Psychiatric/Behavioral: Negative for behavioral problems and confusion.       Objective   Physical Exam   Constitutional: She is oriented to person, place, and time. She appears well-developed and well-nourished.   HENT:   Right Ear: External ear normal.   Left Ear: External ear normal.   Mouth/Throat: Oropharynx is clear and moist.   Eyes: Pupils are equal, round, and reactive to light.   Cardiovascular: Normal rate and regular rhythm.    Pulmonary/Chest: Effort normal and breath sounds normal.   Abdominal: Soft. Bowel sounds are normal.   Neurological: She is alert and oriented to person, place, and time.   Skin: No rash noted.   Psychiatric: She has a normal mood and affect. Her behavior is normal.   Nursing note and vitals reviewed.      Assessment/Plan   Adina was seen today for add.    Diagnoses and all orders for this visit:    Attention deficit disorder, unspecified hyperactivity presence    Other orders  -     Cancel: amphetamine-dextroamphetamine (ADDERALL) 5 MG tablet; 2 tab in the AM and 1 tab in the afternoon  -     amphetamine-dextroamphetamine XR (ADDERALL XR) 15 MG 24 hr capsule; Take 1 capsule by mouth Every Morning

## 2018-10-29 ENCOUNTER — OFFICE VISIT (OUTPATIENT)
Dept: FAMILY MEDICINE CLINIC | Facility: CLINIC | Age: 23
End: 2018-10-29

## 2018-10-29 VITALS
OXYGEN SATURATION: 98 % | RESPIRATION RATE: 16 BRPM | WEIGHT: 140 LBS | DIASTOLIC BLOOD PRESSURE: 68 MMHG | TEMPERATURE: 98.3 F | HEIGHT: 65 IN | SYSTOLIC BLOOD PRESSURE: 108 MMHG | BODY MASS INDEX: 23.32 KG/M2 | HEART RATE: 85 BPM

## 2018-10-29 DIAGNOSIS — F98.8 ATTENTION DEFICIT DISORDER, UNSPECIFIED HYPERACTIVITY PRESENCE: Primary | ICD-10-CM

## 2018-10-29 PROCEDURE — 99213 OFFICE O/P EST LOW 20 MIN: CPT | Performed by: FAMILY MEDICINE

## 2018-10-29 RX ORDER — DEXTROAMPHETAMINE SACCHARATE, AMPHETAMINE ASPARTATE, DEXTROAMPHETAMINE SULFATE AND AMPHETAMINE SULFATE 2.5; 2.5; 2.5; 2.5 MG/1; MG/1; MG/1; MG/1
10 TABLET ORAL DAILY
Qty: 30 TABLET | Refills: 0 | Status: SHIPPED | OUTPATIENT
Start: 2018-10-29 | End: 2018-11-27 | Stop reason: SDUPTHER

## 2018-10-29 RX ORDER — DEXTROAMPHETAMINE SACCHARATE, AMPHETAMINE ASPARTATE, DEXTROAMPHETAMINE SULFATE AND AMPHETAMINE SULFATE 1.25; 1.25; 1.25; 1.25 MG/1; MG/1; MG/1; MG/1
5 TABLET ORAL DAILY
Qty: 30 TABLET | Refills: 0 | Status: SHIPPED | OUTPATIENT
Start: 2018-10-29 | End: 2018-11-27 | Stop reason: DRUGHIGH

## 2018-10-29 NOTE — PROGRESS NOTES
"Subjective   Chief Complaint:   Chief Complaint   Patient presents with   • ADD         History of Present Illness is on Adderall extended release 15 mg in the morning and Adderall 5 mg in the afternoon but her counselor and her decided that the Adderall extended release made her anxiety worse.  So now oriented to try Adderall 10 mg in the morning and an Adderall 5 mg in the afternoon and  to give her enough for a month and she can recheck with me in a month and see how this works.            Adina Trinh 23 y.o. female who presents today for Medical Management of the below listed issues and medication refills.    ICD-10-CM ICD-9-CM   1. Attention deficit disorder, unspecified hyperactivity presence F98.8 314.00        she has a problem list of   Patient Active Problem List   Diagnosis   • ADD (attention deficit disorder)   • Depression   • Herpes simplex of female genitalia   • Herpes simplex type 1 infection   • ERRONEOUS ENCOUNTER--DISREGARD   .  Since the last visit, she has overall felt well.  she has been compliant with No current outpatient prescriptions on file..  she denies medication side effects.    All of the chronic condition(s) listed above are stable w/o issues.    /68   Pulse 85   Temp 98.3 °F (36.8 °C)   Resp 16   Ht 165.1 cm (65\")   Wt 63.5 kg (140 lb)   SpO2 98%   BMI 23.30 kg/m²     Results for orders placed or performed in visit on 02/18/16   Written Authorization   Result Value Ref Range    Written Authorization Comment    HSV 1 and 2 IgM, Abs, Indirect   Result Value Ref Range    HSV 1 IgM <1:10 <1:10 titer    HSV 2 IgM <1:10 <1:10 titer             The following portions of the patient's history were reviewed and updated as appropriate: allergies, current medications, past family history, past medical history, past social history, past surgical history and problem list.    Review of Systems   Constitutional: Negative for activity change, appetite change and unexpected weight " change.   Eyes: Negative for visual disturbance.   Respiratory: Negative for chest tightness and shortness of breath.    Cardiovascular: Negative for chest pain and palpitations.   Skin: Negative for color change.   Neurological: Negative for syncope and speech difficulty.   Psychiatric/Behavioral: Negative for confusion and decreased concentration.       Objective   Physical Exam   Constitutional: She is oriented to person, place, and time. She appears well-developed and well-nourished.   Eyes: Pupils are equal, round, and reactive to light.   Cardiovascular: Normal rate and regular rhythm.    Pulmonary/Chest: Effort normal and breath sounds normal.   Abdominal: Soft.   Neurological: She is alert and oriented to person, place, and time.   Psychiatric: She has a normal mood and affect. Her behavior is normal.   Nursing note and vitals reviewed.      Assessment/Plan   Adina was seen today for add.    Diagnoses and all orders for this visit:    Attention deficit disorder, unspecified hyperactivity presence

## 2018-10-29 NOTE — PATIENT INSTRUCTIONS
Exercise 30 minutes most days of the week  Sleep 6-8 hours each night if possible  Low fat, low cholesterol diet   we discussed prescribed medications and how to take them   make sure you get results of any labs/studies ordered today  Low glycemic index diet   Recheck 4 weeks after finish this medication for a month and we'll go from there.  If any problems

## 2018-11-27 ENCOUNTER — OFFICE VISIT (OUTPATIENT)
Dept: FAMILY MEDICINE CLINIC | Facility: CLINIC | Age: 23
End: 2018-11-27

## 2018-11-27 VITALS
WEIGHT: 140 LBS | DIASTOLIC BLOOD PRESSURE: 82 MMHG | OXYGEN SATURATION: 98 % | TEMPERATURE: 98.4 F | RESPIRATION RATE: 16 BRPM | BODY MASS INDEX: 23.32 KG/M2 | HEART RATE: 89 BPM | SYSTOLIC BLOOD PRESSURE: 135 MMHG | HEIGHT: 65 IN

## 2018-11-27 DIAGNOSIS — F98.8 ATTENTION DEFICIT DISORDER, UNSPECIFIED HYPERACTIVITY PRESENCE: Primary | ICD-10-CM

## 2018-11-27 PROCEDURE — 99213 OFFICE O/P EST LOW 20 MIN: CPT | Performed by: FAMILY MEDICINE

## 2018-11-27 RX ORDER — DEXTROAMPHETAMINE SACCHARATE, AMPHETAMINE ASPARTATE, DEXTROAMPHETAMINE SULFATE AND AMPHETAMINE SULFATE 2.5; 2.5; 2.5; 2.5 MG/1; MG/1; MG/1; MG/1
10 TABLET ORAL DAILY
Qty: 30 TABLET | Refills: 0 | Status: SHIPPED | OUTPATIENT
Start: 2018-11-27 | End: 2019-05-20 | Stop reason: ALTCHOICE

## 2018-11-27 RX ORDER — DEXTROAMPHETAMINE SACCHARATE, AMPHETAMINE ASPARTATE, DEXTROAMPHETAMINE SULFATE AND AMPHETAMINE SULFATE 1.25; 1.25; 1.25; 1.25 MG/1; MG/1; MG/1; MG/1
5 TABLET ORAL DAILY
Qty: 30 TABLET | Refills: 0 | Status: CANCELLED | OUTPATIENT
Start: 2018-11-27

## 2018-11-27 NOTE — PATIENT INSTRUCTIONS
Exercise 30 minutes most days of the week  Sleep 6-8 hours each night if possible  Low fat, low cholesterol diet   we discussed prescribed medications and how to take them   make sure you get results of any labs/studies ordered today  Low glycemic index diet  Patients must be seen every 3 months for their presription medication review and refill required by [place] law.  Patient has been advised on the potential for addiction  and dependence to their prescribed scheduled medication.  MARISSA was obtained today and reviewed. This was scanned into the patient's chart.

## 2018-11-27 NOTE — PROGRESS NOTES
"Subjective   Chief Complaint:   Chief Complaint   Patient presents with   • ADD         History of Present Illness The patient has read and signed the [facility] Controlled Substance Contract.  I will continue to see patient for regular follow up appointments.  They are well controlled on their medication.  MARISSA has been reviewed by me and is updated every 3 months. The patient is aware of the potential for addiction and dependence.  More anxious with the addition of the Adderall 5 to the Adderall 10 cerumen a stop the Adderall 5 and just take the Adderall 10 mg 1 daily and she is going to come back and see me in a month and monitor this.  Is seeing a counselor..              Adina Trinh 23 y.o. female who presents today for Medical Management of the below listed issues and medication refills.    ICD-10-CM ICD-9-CM   1. Attention deficit disorder, unspecified hyperactivity presence F98.8 314.00        she has a problem list of   Patient Active Problem List   Diagnosis   • ADD (attention deficit disorder)   • Depression   • Herpes simplex of female genitalia   • Herpes simplex type 1 infection   • ERRONEOUS ENCOUNTER--DISREGARD   .  Since the last visit, she has overall felt well.  she has been compliant with   Current Outpatient Medications:   •  amphetamine-dextroamphetamine (ADDERALL) 10 MG tablet, Take 1 tablet by mouth Daily., Disp: 30 tablet, Rfl: 0.  she denies medication side effects.    All of the chronic condition(s) listed above are stable w/o issues.    /82   Pulse 89   Temp 98.4 °F (36.9 °C)   Resp 16   Ht 165.1 cm (65\")   Wt 63.5 kg (140 lb)   SpO2 98%   BMI 23.30 kg/m²     Results for orders placed or performed in visit on 02/18/16   Written Authorization   Result Value Ref Range    Written Authorization Comment    HSV 1 and 2 IgM, Abs, Indirect   Result Value Ref Range    HSV 1 IgM <1:10 <1:10 titer    HSV 2 IgM <1:10 <1:10 titer             The following portions of the " patient's history were reviewed and updated as appropriate: allergies, current medications, past family history, past medical history, past social history, past surgical history and problem list.    Review of Systems   Constitutional: Negative for activity change, appetite change and unexpected weight change.   Eyes: Negative for visual disturbance.   Respiratory: Negative for chest tightness and shortness of breath.    Cardiovascular: Negative for chest pain and palpitations.   Skin: Negative for color change.   Neurological: Negative for syncope and speech difficulty.   Psychiatric/Behavioral: Negative for confusion and decreased concentration.       Objective   Physical Exam   Constitutional: She is oriented to person, place, and time. She appears well-developed and well-nourished.   HENT:   Right Ear: External ear normal.   Left Ear: External ear normal.   Mouth/Throat: Oropharynx is clear and moist.   Eyes: Pupils are equal, round, and reactive to light.   Cardiovascular: Normal rate and regular rhythm.   Pulmonary/Chest: Effort normal and breath sounds normal.   Abdominal: Soft. Bowel sounds are normal.   Neurological: She is alert and oriented to person, place, and time.   Psychiatric: She has a normal mood and affect. Her behavior is normal.   Nursing note and vitals reviewed.      Assessment/Plan   Adina was seen today for add.    Diagnoses and all orders for this visit:    Attention deficit disorder, unspecified hyperactivity presence    Other orders  -     amphetamine-dextroamphetamine (ADDERALL) 10 MG tablet; Take 1 tablet by mouth Daily.  -     Cancel: amphetamine-dextroamphetamine (ADDERALL) 5 MG tablet; Take 1 tablet by mouth Daily.

## 2019-04-29 ENCOUNTER — OFFICE VISIT (OUTPATIENT)
Dept: FAMILY MEDICINE CLINIC | Facility: CLINIC | Age: 24
End: 2019-04-29

## 2019-04-29 VITALS
BODY MASS INDEX: 25.16 KG/M2 | RESPIRATION RATE: 16 BRPM | HEIGHT: 65 IN | HEART RATE: 84 BPM | TEMPERATURE: 98.2 F | WEIGHT: 151 LBS | OXYGEN SATURATION: 99 % | SYSTOLIC BLOOD PRESSURE: 117 MMHG | DIASTOLIC BLOOD PRESSURE: 72 MMHG

## 2019-04-29 DIAGNOSIS — F98.8 ATTENTION DEFICIT DISORDER (ADD) WITHOUT HYPERACTIVITY: Primary | ICD-10-CM

## 2019-04-29 PROCEDURE — 99213 OFFICE O/P EST LOW 20 MIN: CPT | Performed by: FAMILY MEDICINE

## 2019-04-29 RX ORDER — ARIPIPRAZOLE 15 MG/1
TABLET ORAL
Refills: 0 | COMMUNITY
Start: 2019-03-21 | End: 2019-05-20

## 2019-04-29 NOTE — PROGRESS NOTES
Subjective   Chief Complaint:   Chief Complaint   Patient presents with   • ADHD         History of Present Illness     The patient has read and signed the [facility] Controlled Substance Contract.  I will continue to see patient for regular follow up appointments.  They are well controlled on their medication.  MARISSA has been reviewed by me and is updated every 3 months. The patient is aware of the potential for addiction and dependence.  Not refilled Adderall for this patient and probably 4 months.  She saw a psychiatrist and was put on 5 mg Abilify and she said really she does not feel like that helped her ADD and its made her feel more depressed.  Talked to her and told her I like her to get a consult with Jeb and Nancy.  Do that first you can call the psychiatrist and talk to her since her out of the Abilify she might want you to be weaned off that.  Do a call the psychiatrist her syrup and explained that that Abilify made you feel worse and it did not help you.  And that I suggested since I had on Adderall he had some side effects of that to that you see Jeb and Nancy and get a consult from them.              Adina Trinh 24 y.o. female who presents today for Medical Management of the below listed issues and medication refills.   get a consult about ADD and medication and then come back and see me.   from Jeb escobar. she has a problem list of   Patient Active Problem List   Diagnosis   • ADD (attention deficit disorder)   • Depression   • Herpes simplex of female genitalia   • Herpes simplex type 1 infection   .  Since the last visit, she has overall felt well.  she has been compliant with   Current Outpatient Medications:   •  ARIPiprazole (ABILIFY) 15 MG tablet, TK SS A T PO QHS, Disp: , Rfl: 0  •  amphetamine-dextroamphetamine (ADDERALL) 10 MG tablet, Take 1 tablet by mouth Daily., Disp: 30 tablet, Rfl: 0.  she denies medication side effects.    All of the chronic condition(s)  "listed above are stable w/o issues.    /72   Pulse 84   Temp 98.2 °F (36.8 °C)   Resp 16   Ht 165.1 cm (65\")   Wt 68.5 kg (151 lb)   SpO2 99%   BMI 25.13 kg/m²     Results for orders placed or performed in visit on 02/18/16   Written Authorization   Result Value Ref Range    Written Authorization Comment    HSV 1 and 2 IgM, Abs, Indirect   Result Value Ref Range    HSV 1 IgM <1:10 <1:10 titer    HSV 2 IgM <1:10 <1:10 titer             The following portions of the patient's history were reviewed and updated as appropriate: allergies, current medications, past family history, past medical history, past social history, past surgical history and problem list.    Review of Systems   Constitutional: Negative for activity change and appetite change.   Gastrointestinal: Negative for abdominal pain.   Psychiatric/Behavioral: Negative for agitation, behavioral problems, confusion and decreased concentration.       Objective   Physical Exam   Constitutional: She is oriented to person, place, and time. She appears well-developed and well-nourished.   HENT:   Mouth/Throat: Oropharynx is clear and moist.   Eyes: Pupils are equal, round, and reactive to light.   Cardiovascular: Normal rate and regular rhythm.   Pulmonary/Chest: Effort normal and breath sounds normal.   Abdominal: Soft. Bowel sounds are normal.   Neurological: She is alert and oriented to person, place, and time. She displays normal reflexes. No cranial nerve deficit. Coordination normal.   Psychiatric: She has a normal mood and affect. Her behavior is normal.   Nursing note and vitals reviewed.      Assessment/Plan   Adina was seen today for adhd.    Diagnoses and all orders for this visit:    Attention deficit disorder (ADD) without hyperactivity                 "

## 2019-05-20 ENCOUNTER — OFFICE VISIT (OUTPATIENT)
Dept: FAMILY MEDICINE CLINIC | Facility: CLINIC | Age: 24
End: 2019-05-20

## 2019-05-20 VITALS
TEMPERATURE: 98.1 F | BODY MASS INDEX: 24.66 KG/M2 | DIASTOLIC BLOOD PRESSURE: 76 MMHG | HEART RATE: 71 BPM | OXYGEN SATURATION: 98 % | RESPIRATION RATE: 16 BRPM | HEIGHT: 65 IN | SYSTOLIC BLOOD PRESSURE: 110 MMHG | WEIGHT: 148 LBS

## 2019-05-20 DIAGNOSIS — F41.9 ANXIETY SYNDROME: ICD-10-CM

## 2019-05-20 DIAGNOSIS — F98.8 ATTENTION DEFICIT DISORDER, UNSPECIFIED HYPERACTIVITY PRESENCE: Primary | ICD-10-CM

## 2019-05-20 PROCEDURE — 99213 OFFICE O/P EST LOW 20 MIN: CPT | Performed by: FAMILY MEDICINE

## 2019-05-20 RX ORDER — DEXTROAMPHETAMINE SACCHARATE, AMPHETAMINE ASPARTATE, DEXTROAMPHETAMINE SULFATE AND AMPHETAMINE SULFATE 1.25; 1.25; 1.25; 1.25 MG/1; MG/1; MG/1; MG/1
5 TABLET ORAL 2 TIMES DAILY
COMMUNITY
End: 2019-05-20 | Stop reason: SDUPTHER

## 2019-05-20 RX ORDER — DEXTROAMPHETAMINE SACCHARATE, AMPHETAMINE ASPARTATE, DEXTROAMPHETAMINE SULFATE AND AMPHETAMINE SULFATE 2.5; 2.5; 2.5; 2.5 MG/1; MG/1; MG/1; MG/1
10 TABLET ORAL DAILY
Qty: 30 TABLET | Refills: 0 | Status: CANCELLED | OUTPATIENT
Start: 2019-05-20

## 2019-05-20 RX ORDER — DEXTROAMPHETAMINE SACCHARATE, AMPHETAMINE ASPARTATE, DEXTROAMPHETAMINE SULFATE AND AMPHETAMINE SULFATE 1.25; 1.25; 1.25; 1.25 MG/1; MG/1; MG/1; MG/1
5 TABLET ORAL 2 TIMES DAILY
Qty: 60 TABLET | Refills: 0 | Status: SHIPPED | OUTPATIENT
Start: 2019-05-20 | End: 2019-07-18 | Stop reason: SDUPTHER

## 2019-05-20 NOTE — PROGRESS NOTES
"Subjective   Chief Complaint:   Chief Complaint   Patient presents with   • ADD         History of Present Illness     The patient has read and signed the [facility] Controlled Substance Contract.  I will continue to see patient for regular follow up appointments.  They are well controlled on their medication.  MARISSA has been reviewed by me and is updated every 3 months. The patient is aware of the potential for addiction and dependence.  We discussed the dose of Adderall and decided the best doses to try Adderall 5 mg 1 p.o. twice daily for 30 days and with that for sleep try melatonin at nighttime 5 mg and she is going to call me back in a month and see if that dose works and hopefully she will be able to sleep with the schedule call me in 1 month            Adina Trinh 24 y.o. female who presents today for Medical Management of the below listed issues and medication refills.    ICD-10-CM ICD-9-CM   1. Attention deficit disorder, unspecified hyperactivity presence F98.8 314.00   2. Anxiety syndrome F41.9 300.00        she has a problem list of   Patient Active Problem List   Diagnosis   • ADD (attention deficit disorder)   • Depression   • Herpes simplex of female genitalia   • Herpes simplex type 1 infection   .  Since the last visit, she has overall felt well.  she has been compliant with   Current Outpatient Medications:   •  amphetamine-dextroamphetamine (ADDERALL) 5 MG tablet, Take 1 tablet by mouth 2 (Two) Times a Day for 60 doses., Disp: 60 tablet, Rfl: 0.  she denies medication side effects.    All of the chronic condition(s) listed above are stable w/o issues.    /76   Pulse 71   Temp 98.1 °F (36.7 °C)   Resp 16   Ht 165.1 cm (65\")   Wt 67.1 kg (148 lb)   SpO2 98%   BMI 24.63 kg/m²     Results for orders placed or performed in visit on 02/18/16   Written Authorization   Result Value Ref Range    Written Authorization Comment    HSV 1 and 2 IgM, Abs, Indirect   Result Value Ref Range "    HSV 1 IgM <1:10 <1:10 titer    HSV 2 IgM <1:10 <1:10 titer             The following portions of the patient's history were reviewed and updated as appropriate: allergies, current medications, past family history, past medical history, past social history, past surgical history and problem list.    Review of Systems   Constitutional: Negative for activity change, appetite change and unexpected weight change.   Eyes: Negative for visual disturbance.   Respiratory: Negative for chest tightness and shortness of breath.    Cardiovascular: Negative for chest pain and palpitations.   Skin: Negative for color change.   Neurological: Negative for syncope and speech difficulty.   Psychiatric/Behavioral: Negative for confusion and decreased concentration.       Objective   Physical Exam   Constitutional: She is oriented to person, place, and time. She appears well-developed and well-nourished.   HENT:   Mouth/Throat: Oropharynx is clear and moist.   Eyes: Pupils are equal, round, and reactive to light.   Cardiovascular: Normal rate and regular rhythm.   Pulmonary/Chest: Effort normal and breath sounds normal.   Abdominal: Soft. Bowel sounds are normal.   Neurological: She is alert and oriented to person, place, and time.   Psychiatric: She has a normal mood and affect. Her behavior is normal.   Nursing note and vitals reviewed.      Assessment/Plan   Adina was seen today for add.    Diagnoses and all orders for this visit:    Attention deficit disorder, unspecified hyperactivity presence    Anxiety syndrome    Other orders  -     Cancel: amphetamine-dextroamphetamine (ADDERALL) 10 MG tablet; Take 1 tablet by mouth Daily.  -     amphetamine-dextroamphetamine (ADDERALL) 5 MG tablet; Take 1 tablet by mouth 2 (Two) Times a Day for 60 doses.

## 2019-07-18 RX ORDER — DEXTROAMPHETAMINE SACCHARATE, AMPHETAMINE ASPARTATE, DEXTROAMPHETAMINE SULFATE AND AMPHETAMINE SULFATE 1.25; 1.25; 1.25; 1.25 MG/1; MG/1; MG/1; MG/1
5 TABLET ORAL 2 TIMES DAILY
Qty: 60 TABLET | Refills: 0 | Status: SHIPPED | OUTPATIENT
Start: 2019-07-18 | End: 2019-08-17

## 2019-08-27 ENCOUNTER — TELEPHONE (OUTPATIENT)
Dept: FAMILY MEDICINE CLINIC | Facility: CLINIC | Age: 24
End: 2019-08-27

## 2019-09-11 ENCOUNTER — OFFICE VISIT (OUTPATIENT)
Dept: FAMILY MEDICINE CLINIC | Facility: CLINIC | Age: 24
End: 2019-09-11

## 2019-09-11 VITALS
RESPIRATION RATE: 16 BRPM | BODY MASS INDEX: 25.83 KG/M2 | TEMPERATURE: 99.2 F | HEART RATE: 80 BPM | DIASTOLIC BLOOD PRESSURE: 76 MMHG | WEIGHT: 155 LBS | OXYGEN SATURATION: 98 % | HEIGHT: 65 IN | SYSTOLIC BLOOD PRESSURE: 114 MMHG

## 2019-09-11 DIAGNOSIS — F98.8 ATTENTION DEFICIT DISORDER, UNSPECIFIED HYPERACTIVITY PRESENCE: Primary | ICD-10-CM

## 2019-09-11 PROCEDURE — 99213 OFFICE O/P EST LOW 20 MIN: CPT | Performed by: FAMILY MEDICINE

## 2019-09-11 RX ORDER — DEXTROAMPHETAMINE SACCHARATE, AMPHETAMINE ASPARTATE, DEXTROAMPHETAMINE SULFATE AND AMPHETAMINE SULFATE 1.25; 1.25; 1.25; 1.25 MG/1; MG/1; MG/1; MG/1
5 TABLET ORAL DAILY
COMMUNITY
End: 2019-09-11 | Stop reason: SDUPTHER

## 2019-09-11 RX ORDER — DEXTROAMPHETAMINE SACCHARATE, AMPHETAMINE ASPARTATE, DEXTROAMPHETAMINE SULFATE AND AMPHETAMINE SULFATE 1.25; 1.25; 1.25; 1.25 MG/1; MG/1; MG/1; MG/1
TABLET ORAL
Qty: 90 TABLET | Refills: 0 | Status: SHIPPED | OUTPATIENT
Start: 2019-09-11 | End: 2019-10-18 | Stop reason: SDUPTHER

## 2019-09-11 NOTE — PROGRESS NOTES
"Subjective   Chief Complaint:   Chief Complaint   Patient presents with   • ADHD         History of Present Illness     The patient has read and signed the [facility] Controlled Substance Contract.  I will continue to see patient for regular follow up appointments.  They are well controlled on their medication.  MARISSA has been reviewed by me and is updated every 3 months. The patient is aware of the potential for addiction and dependence.  Was taking Adderall regular strength 5 mg twice daily and she thinks she needs more Adderall in the morning so we will get increase the Adderall to 5 mg take 2 tablets in a.m. and 1 tablet in the afternoon and and I am going to give her 90 she is going to call us back for the refill and let me know if that dose is working better.  Of course call sooner if any problems.          Adina Trinh 24 y.o. female who presents today for Medical Management of the below listed issues and medication refills.    ICD-10-CM ICD-9-CM   1. Attention deficit disorder, unspecified hyperactivity presence F98.8 314.00        she has a problem list of   Patient Active Problem List   Diagnosis   • ADD (attention deficit disorder)   • Depression   • Herpes simplex of female genitalia   • Herpes simplex type 1 infection   .    she has been compliant with   Current Outpatient Medications:   •  amphetamine-dextroamphetamine (ADDERALL) 5 MG tablet, Take 5 mg by mouth Daily., Disp: , Rfl: .  she denies medication side effects.        /76   Pulse 80   Temp 99.2 °F (37.3 °C)   Resp 16   Ht 165.1 cm (65\")   Wt 70.3 kg (155 lb)   SpO2 98%   BMI 25.79 kg/m²     Results for orders placed or performed in visit on 02/18/16   Written Authorization   Result Value Ref Range    Written Authorization Comment    HSV 1 and 2 IgM, Abs, Indirect   Result Value Ref Range    HSV 1 IgM <1:10 <1:10 titer    HSV 2 IgM <1:10 <1:10 titer             The following portions of the patient's history were reviewed " and updated as appropriate: allergies, current medications, past family history, past medical history, past social history, past surgical history and problem list.    Review of Systems   Constitutional: Negative for activity change, appetite change and unexpected weight change.   HENT: Negative for congestion.    Eyes: Negative for visual disturbance.   Respiratory: Negative for chest tightness and shortness of breath.    Cardiovascular: Negative for chest pain and palpitations.   Gastrointestinal: Negative for abdominal pain.   Musculoskeletal: Negative for back pain.   Skin: Negative for color change.   Neurological: Negative for dizziness, syncope, speech difficulty and headaches.   Psychiatric/Behavioral: Negative for confusion and decreased concentration.       Objective   Physical Exam   Constitutional: She is oriented to person, place, and time. She appears well-developed and well-nourished.   HENT:   Mouth/Throat: Oropharynx is clear and moist.   Eyes: Pupils are equal, round, and reactive to light.   Neck: Normal range of motion. No thyromegaly present.   Cardiovascular: Normal rate and regular rhythm.   Pulmonary/Chest: Effort normal and breath sounds normal.   Abdominal: Soft. Bowel sounds are normal.   Musculoskeletal: Normal range of motion.   Neurological: She is alert and oriented to person, place, and time. No sensory deficit.   Psychiatric: She has a normal mood and affect. Her behavior is normal. Judgment and thought content normal.   Nursing note and vitals reviewed.      Assessment/Plan   Adina was seen today for adhd.    Diagnoses and all orders for this visit:    Attention deficit disorder, unspecified hyperactivity presence    Other orders  -     amphetamine-dextroamphetamine (ADDERALL) 5 MG tablet; Take 1 tablet by mouth 2 (Two) Times a Day.                   Her changing the dose of Adderall to Adderall 5 mg tablets take 2 tablets in the a.m. and 1 tablet in the afternoon and she is going  to get 90 and she is going to call me before the next refill and let me know that this is helping her better.

## 2019-09-11 NOTE — PATIENT INSTRUCTIONS
Exercise 30 minutes most days of the week  Sleep 6-8 hours each night if possible  Low fat, low cholesterol diet   we discussed prescribed medications and how to take them   make sure you get results of any labs/studies ordered today  Low glycemic index diet   Call me back in 1 month and let me know if Adderall 5 mg tablets where she is taking 2 tablets in a.m. and 1 tablet in the afternoon is working for her or call me sooner if any side effects.

## 2019-10-19 RX ORDER — DEXTROAMPHETAMINE SACCHARATE, AMPHETAMINE ASPARTATE, DEXTROAMPHETAMINE SULFATE AND AMPHETAMINE SULFATE 1.25; 1.25; 1.25; 1.25 MG/1; MG/1; MG/1; MG/1
TABLET ORAL
Qty: 90 TABLET | Refills: 0 | Status: SHIPPED | OUTPATIENT
Start: 2019-10-19 | End: 2019-10-28 | Stop reason: SDUPTHER

## 2019-10-28 NOTE — TELEPHONE ENCOUNTER
Please resend Adderall Rx - it was set to print, so it did not go through to pharm.  I corrected it.

## 2019-10-29 RX ORDER — DEXTROAMPHETAMINE SACCHARATE, AMPHETAMINE ASPARTATE, DEXTROAMPHETAMINE SULFATE AND AMPHETAMINE SULFATE 1.25; 1.25; 1.25; 1.25 MG/1; MG/1; MG/1; MG/1
TABLET ORAL
Qty: 90 TABLET | Refills: 0 | Status: SHIPPED | OUTPATIENT
Start: 2019-10-29 | End: 2019-12-06 | Stop reason: SDUPTHER

## 2019-12-06 RX ORDER — DEXTROAMPHETAMINE SACCHARATE, AMPHETAMINE ASPARTATE, DEXTROAMPHETAMINE SULFATE AND AMPHETAMINE SULFATE 1.25; 1.25; 1.25; 1.25 MG/1; MG/1; MG/1; MG/1
TABLET ORAL
Qty: 90 TABLET | Refills: 0 | Status: SHIPPED | OUTPATIENT
Start: 2019-12-06 | End: 2020-01-08 | Stop reason: SDUPTHER

## 2020-01-08 ENCOUNTER — OFFICE VISIT (OUTPATIENT)
Dept: FAMILY MEDICINE CLINIC | Facility: CLINIC | Age: 25
End: 2020-01-08

## 2020-01-08 VITALS
HEART RATE: 85 BPM | WEIGHT: 161 LBS | TEMPERATURE: 98.6 F | DIASTOLIC BLOOD PRESSURE: 86 MMHG | RESPIRATION RATE: 16 BRPM | HEIGHT: 65 IN | OXYGEN SATURATION: 99 % | SYSTOLIC BLOOD PRESSURE: 142 MMHG | BODY MASS INDEX: 26.82 KG/M2

## 2020-01-08 DIAGNOSIS — F98.8 ATTENTION DEFICIT DISORDER, UNSPECIFIED HYPERACTIVITY PRESENCE: Primary | ICD-10-CM

## 2020-01-08 DIAGNOSIS — F32.A DEPRESSION, UNSPECIFIED DEPRESSION TYPE: ICD-10-CM

## 2020-01-08 PROCEDURE — 99212 OFFICE O/P EST SF 10 MIN: CPT | Performed by: FAMILY MEDICINE

## 2020-01-08 RX ORDER — DEXTROAMPHETAMINE SACCHARATE, AMPHETAMINE ASPARTATE, DEXTROAMPHETAMINE SULFATE AND AMPHETAMINE SULFATE 1.25; 1.25; 1.25; 1.25 MG/1; MG/1; MG/1; MG/1
TABLET ORAL
Qty: 90 TABLET | Refills: 0 | Status: SHIPPED | OUTPATIENT
Start: 2020-01-08 | End: 2020-02-05 | Stop reason: SDUPTHER

## 2020-01-08 NOTE — PROGRESS NOTES
Subjective   Chief Complaint:   Chief Complaint   Patient presents with   • ADHD         History of Present Illness     The patient has read and signed the [facility] Controlled Substance Contract.  I will continue to see patient for regular follow up appointments.  They are well controlled on their medication.  MARISSA has been reviewed by me and is updated every 3 months. The patient is aware of the potential for addiction and dependence.  She is doing well on taking Adderall 10 mg 2 in the morning and 1 in the afternoon at 1 PM she discussed she is starting school back the next week and that she might need to take to the 5 mg at 1 PM especially for a lab day on a Monday this can be at 5 PM and I said that is fine she can call me so to start out with working to do Adderall 5 mg take 2 tabs in the morning and 1 tablet in the afternoon and at about 1 PM.  And this works fine and then she is going to call me if she needs an extra 5 mg in the afternoon at 1 PM.  On any medication at present for depression uses exercises and running and she said that works good for depression along with meditation.              Adina Trinh 24 y.o. female who presents today for Medical Management of the below listed issues and medication refills.  ADD able and good on medication.  Call me back and let me know how this dose works and let me know if she possibly might go to Adderall 5 mg 2 in the morning and 2 at 1 PM in the afternoon   she has a problem list of   Patient Active Problem List   Diagnosis   • ADD (attention deficit disorder)   • Depression   • Herpes simplex of female genitalia   • Herpes simplex type 1 infection   .    she has been compliant with   Current Outpatient Medications:   •  amphetamine-dextroamphetamine (ADDERALL) 5 MG tablet, Take two tablets in the AM and one tablet in the afternoon, Disp: 90 tablet, Rfl: 0.  she denies medication side effects.        /86   Pulse 85   Temp 98.6 °F (37 °C)   Resp  "16   Ht 165.1 cm (65\")   Wt 73 kg (161 lb)   SpO2 99%   BMI 26.79 kg/m²     Results for orders placed or performed in visit on 02/18/16   Written Authorization   Result Value Ref Range    Written Authorization Comment    HSV 1 and 2 IgM, Abs, Indirect   Result Value Ref Range    HSV 1 IgM <1:10 <1:10 titer    HSV 2 IgM <1:10 <1:10 titer       The following portions of the patient's history were reviewed and updated as appropriate: allergies, current medications, past family history, past medical history, past social history, past surgical history and problem list.      she has a history of   Patient Active Problem List   Diagnosis   • ADD (attention deficit disorder)   • Depression   • Herpes simplex of female genitalia   • Herpes simplex type 1 infection       Review of Systems   Constitutional: Negative for activity change, appetite change and unexpected weight change.   Eyes: Negative for visual disturbance.   Respiratory: Negative for chest tightness and shortness of breath.    Cardiovascular: Negative for chest pain and palpitations.   Skin: Negative for color change.   Neurological: Negative for syncope and speech difficulty.   Psychiatric/Behavioral: Negative for confusion and decreased concentration.       Objective   Physical Exam   Constitutional: She is oriented to person, place, and time. She appears well-developed and well-nourished.   HENT:   Right Ear: External ear normal.   Left Ear: External ear normal.   Mouth/Throat: Oropharynx is clear and moist.   Eyes: Pupils are equal, round, and reactive to light.   Cardiovascular: Normal rate and regular rhythm.   Pulmonary/Chest: Effort normal and breath sounds normal.   Abdominal: Soft. Bowel sounds are normal.   Neurological: She is alert and oriented to person, place, and time.   Psychiatric: She has a normal mood and affect. Her behavior is normal.   Nursing note and vitals reviewed.      Assessment/Plan   Adina was seen today for " adhd.    Diagnoses and all orders for this visit:    Attention deficit disorder, unspecified hyperactivity presence    Depression, unspecified depression type

## 2020-01-08 NOTE — PATIENT INSTRUCTIONS
Attention Deficit Hyperactivity Disorder, Adult  Attention deficit hyperactivity disorder (ADHD) is a mental health disorder that starts during childhood. For many people with ADHD, the disorder continues into adult years. There are many things that you and your health care provider or therapist (mental health professional) can do to manage your symptoms.  What are the causes?  The exact cause of ADHD is not known.  What increases the risk?  You are more likely to develop this condition if:  · You have a family history of ADHD.  · You are male.  · You were born to a mother who smoked or drank alcohol during pregnancy.  · You were exposed to lead poisoning or other toxins in the womb or in early life.  · You were born before 37 weeks of pregnancy (prematurely) or you had a low birth weight.  · You have experienced a brain injury.  What are the signs or symptoms?  Symptoms of this condition depend on the type of ADHD. The two main types are inattentive and hyperactive-impulsive. Some people may have symptoms of both types.  Symptoms of the inattentive type include:  · Difficulty watching, listening, or thinking with focused effort (paying attention).  · Making careless mistakes.  · Not listening.  · Not following instructions.  · Being disorganized.  · Avoiding tasks that require time and attention.  · Losing things.  · Forgetting things.  · Being easily distracted.  Symptoms of the hyperactive-impulsive type include:  · Restlessness.  · Talking too much.  · Interrupting.  · Difficulty with:  ? Sitting still.  ? Staying quiet.  ? Feeling motivated.  ? Relaxing.  ? Waiting in line or waiting for a turn.  How is this diagnosed?  This condition is diagnosed based on your current symptoms and your history of symptoms. The diagnosis can be made by a provider such as a primary care provider, psychiatrist, psychologist, or clinical . The provider may use a symptom checklist or a standardized behavior rating  scale to evaluate your symptoms. He or she may want to talk with family members who have known you for a long time and have observed your behaviors.  There are no lab tests or brain imaging tests that can diagnose ADHD.  How is this treated?  This condition can be treated with medicines and behavior therapy. Medicines may be the best option to reduce impulsive behaviors and improve attention. Your health care provider may recommend:  · Stimulant medicines. These are the most common medicines used for adult ADHD. They affect certain chemicals in the brain (neurotransmitters). These medicines may be long-acting or short-acting. This will determine how often you need to take the medicine.  · A non-stimulant medicine for adult ADHD (atomoxetine). This medicine increases a neurotransmitter called norepinephrine. It may take weeks to months to see effects from this medicine.  Psychotherapy and behavioral management are also important for treating ADHD. Psychotherapy is often used along with medicine. Your health care provider may suggest:  · Cognitive behavioral therapy (CBT). This type of therapy teaches you to replace negative thoughts and actions with positive thoughts and actions. When used as part of ADHD treatment, this therapy may also include:  ? Coping strategies for organization, time management, impulse control, and stress reduction.  ? Mindfulness and meditation training.  · Behavioral management. This may include strategies for organization and time management. You may work with an ADHD  who is specially trained to help people with ADHD to manage and organize activities and to function more effectively.  Follow these instructions at home:  Medicines    · Take over-the-counter and prescription medicines only as told by your health care provider.  · Talk with your health care provider about the possible side effects of your medicine to watch for.  General instructions    · Learn as much as you can about  adult ADHD, and work closely with your health care providers to find the treatments that work best for you.  · Do not use drugs or abuse alcohol. Limit alcohol intake to no more than 1 drink a day for nonpregnant women and 2 drinks a day for men. One drink equals 12 oz of beer, 5 oz of wine, or 1½ oz of hard liquor.  · Follow the same schedule each day. Make sure your schedule includes enough time for you to get plenty of sleep.  · Use reminder devices like notes, calendars, and phone apps to stay on-time and organized.  · Eat a healthy diet. Do not skip meals.  · Exercise regularly. Exercise can help to reduce stress and anxiety.  · Keep all follow-up visits as told by your health care provider and therapist. This is important.  Where to find more information  · A health care provider may be able to recommend resources that are available online or over the phone. You could start with:  ? Attention Deficit Disorder Association (ADDA): www.add.org  ? National [place] of Mental Health (NIMH): www.nimh.nih.gov  Contact a health care provider if:  · Your symptoms are changing, getting worse, or not improving.  · You have side effects from your medicine, such as:  ? Repeated muscle twitches, coughing, or speech outbursts.  ? Sleep problems.  ? Loss of appetite.  ? Depression.  ? New or worsening behavior problems.  ? Dizziness.  ? Unusually fast heartbeat.  ? Stomach pains.  ? Headaches.  · You are struggling with anxiety, depression, or substance abuse.  Get help right away if:  · You have a severe reaction to a medicine.  · You have thoughts of hurting yourself or others.  If you ever feel like you may hurt yourself or others, or have thoughts about taking your own life, get help right away. You can go to the nearest emergency department or call:  · Your local emergency services (911 in the [place]  · A suicide crisis helpline, such as the National Suicide Prevention Lifeline at 1-952.664.9497. This is open 24 hours a  day.  Summary  · ADHD is a mental health disorder that starts during childhood and often continues into adult years.  · The exact cause of ADHD is not known.  · There is no cure for ADHD, but treatment with medicine, therapy, or behavioral training can help you manage your condition.  This information is not intended to replace advice given to you by your health care provider. Make sure you discuss any questions you have with your health care provider.  Document Released: 08/09/2018 Document Revised: 08/09/2018 Document Reviewed: 08/09/2018  [employer] Patient Education © 2019 [employer]  ASKED ABOUT FLU SHOT  AND SHE REFUSED

## 2020-02-05 DIAGNOSIS — F32.A DEPRESSION, UNSPECIFIED DEPRESSION TYPE: ICD-10-CM

## 2020-02-05 DIAGNOSIS — F98.8 ATTENTION DEFICIT DISORDER, UNSPECIFIED HYPERACTIVITY PRESENCE: ICD-10-CM

## 2020-02-05 RX ORDER — DEXTROAMPHETAMINE SACCHARATE, AMPHETAMINE ASPARTATE, DEXTROAMPHETAMINE SULFATE AND AMPHETAMINE SULFATE 1.25; 1.25; 1.25; 1.25 MG/1; MG/1; MG/1; MG/1
TABLET ORAL
Qty: 120 TABLET | Refills: 0 | Status: SHIPPED | OUTPATIENT
Start: 2020-02-05 | End: 2020-02-27 | Stop reason: SDUPTHER

## 2020-02-05 NOTE — TELEPHONE ENCOUNTER
Patient states in last office visit it was discussed that she would increase her dosage of two 5mg of adderall in the morning and one 5mg in the afternoon to two 5mg of adderall in the morning and afternoon.  Per MD suggestion she tried it and is reporting that it is working well for her.  She is requesting that her prescriptions now reflect the new dosage of adderall.

## 2020-02-27 DIAGNOSIS — F98.8 ATTENTION DEFICIT DISORDER, UNSPECIFIED HYPERACTIVITY PRESENCE: ICD-10-CM

## 2020-02-27 DIAGNOSIS — F32.A DEPRESSION, UNSPECIFIED DEPRESSION TYPE: ICD-10-CM

## 2020-02-28 ENCOUNTER — OFFICE VISIT (OUTPATIENT)
Dept: FAMILY MEDICINE CLINIC | Facility: CLINIC | Age: 25
End: 2020-02-28

## 2020-02-28 VITALS
HEIGHT: 65 IN | HEART RATE: 92 BPM | WEIGHT: 161 LBS | OXYGEN SATURATION: 99 % | RESPIRATION RATE: 16 BRPM | DIASTOLIC BLOOD PRESSURE: 73 MMHG | BODY MASS INDEX: 26.82 KG/M2 | TEMPERATURE: 98.8 F | SYSTOLIC BLOOD PRESSURE: 121 MMHG

## 2020-02-28 DIAGNOSIS — J02.9 PHARYNGITIS, UNSPECIFIED ETIOLOGY: Primary | ICD-10-CM

## 2020-02-28 PROCEDURE — 99212 OFFICE O/P EST SF 10 MIN: CPT | Performed by: FAMILY MEDICINE

## 2020-02-28 RX ORDER — VALACYCLOVIR HYDROCHLORIDE 500 MG/1
TABLET, FILM COATED ORAL
COMMUNITY
Start: 2020-02-26

## 2020-02-28 RX ORDER — AZITHROMYCIN 250 MG/1
TABLET, FILM COATED ORAL
Qty: 6 TABLET | Refills: 0 | Status: SHIPPED | OUTPATIENT
Start: 2020-02-28

## 2020-02-28 RX ORDER — DEXTROAMPHETAMINE SACCHARATE, AMPHETAMINE ASPARTATE, DEXTROAMPHETAMINE SULFATE AND AMPHETAMINE SULFATE 1.25; 1.25; 1.25; 1.25 MG/1; MG/1; MG/1; MG/1
TABLET ORAL
Qty: 120 TABLET | Refills: 0 | Status: SHIPPED | OUTPATIENT
Start: 2020-02-28 | End: 2020-04-09 | Stop reason: SDUPTHER

## 2020-02-28 NOTE — PROGRESS NOTES
"Subjective   Chief Complaint:   Chief Complaint   Patient presents with   • Sore in back of throat         History of Present Illness patient came in today to be her to have a throat looked at she has a sore throat looks viral in nature she has some tender lymphadenopathy her ears are negative her throat she has 1 small area that looks infected but is not strep did not does not look like strep looks more like a viral infection but I will go ahead and she can stop her Valtrex and I will go ahead and give her a Z-Bruce 250 mg take as directed she can take an antihistamine over-the-counter and may be a decongestant as she feels like that helps her and gargle with salt water would help to        Past Medical History:   Diagnosis Date   • Anxiety    • Attention deficit disorder (ADD)    • Depression    • Fatigue    • H/O bone density study    • Mononucleosis    • Routine eye exam utd        Adina Trinh 24 y.o. female who presents today for Medical Management of Ulcer like sore in the back of her throat.  Patient is also requesting refill of Adderall.    ICD-10-CM ICD-9-CM   1. Pharyngitis, unspecified etiology J02.9 462        she has a problem list of   Patient Active Problem List   Diagnosis   • ADD (attention deficit disorder)   .    she has been compliant with   Current Outpatient Medications:   •  amphetamine-dextroamphetamine (ADDERALL) 5 MG tablet, Take two tablets in the AM and two  Tablets  in the afternoon, Disp: 120 tablet, Rfl: 0  •  valACYclovir (VALTREX) 500 MG tablet, TK 1 T PO BID FOR 3 DAYS, Disp: , Rfl:   •  azithromycin (ZITHROMAX) 250 MG tablet, Take 2 tablets the first day, then 1 tablet daily for 4 days., Disp: 6 tablet, Rfl: 0.  she denies medication side effects.        /73   Pulse 92   Temp 98.8 °F (37.1 °C)   Resp 16   Ht 165.1 cm (65\")   Wt 73 kg (161 lb)   SpO2 99%   BMI 26.79 kg/m²     Results for orders placed or performed in visit on 02/18/16   Written Authorization   Result " Value Ref Range    Written Authorization Comment    HSV 1 and 2 IgM, Abs, Indirect   Result Value Ref Range    HSV 1 IgM <1:10 <1:10 titer    HSV 2 IgM <1:10 <1:10 titer       The following portions of the patient's history were reviewed and updated as appropriate: allergies, current medications, past family history, past medical history, past social history, past surgical history and problem list.      she has a history of   Patient Active Problem List   Diagnosis   • ADD (attention deficit disorder)       Review of Systems   Constitutional: Negative for activity change, appetite change and unexpected weight change.   Eyes: Negative for visual disturbance.   Respiratory: Negative for chest tightness and shortness of breath.    Cardiovascular: Negative for chest pain and palpitations.   Skin: Negative for color change.   Neurological: Negative for syncope and speech difficulty.   Psychiatric/Behavioral: Negative for confusion and decreased concentration.       Objective   Physical Exam   Constitutional: She is oriented to person, place, and time. She appears well-developed and well-nourished.   HENT:   Right Ear: External ear normal.   Left Ear: External ear normal.   Mouth/Throat: Oropharynx is clear and moist.   Eyes: Pupils are equal, round, and reactive to light.   Neck: Normal range of motion. Neck supple.   She does have 1 small spot of exudate on the left tonsil and other than that her throat looks okay   Cardiovascular: Normal rate and regular rhythm.   Pulmonary/Chest: Effort normal and breath sounds normal.   Abdominal: Soft. Bowel sounds are normal.   Lymphadenopathy:     She has cervical adenopathy.   Neurological: She is alert and oriented to person, place, and time.   Skin: No rash noted. No erythema.   Psychiatric: She has a normal mood and affect. Her behavior is normal.   Nursing note and vitals reviewed.      Assessment/Plan   Adina was seen today for sore in back of throat.    Diagnoses and all  orders for this visit:    Pharyngitis, unspecified etiology    Other orders  -     azithromycin (ZITHROMAX) 250 MG tablet; Take 2 tablets the first day, then 1 tablet daily for 4 days.

## 2020-04-09 DIAGNOSIS — F32.A DEPRESSION, UNSPECIFIED DEPRESSION TYPE: ICD-10-CM

## 2020-04-09 DIAGNOSIS — F98.8 ATTENTION DEFICIT DISORDER, UNSPECIFIED HYPERACTIVITY PRESENCE: ICD-10-CM

## 2020-04-09 RX ORDER — DEXTROAMPHETAMINE SACCHARATE, AMPHETAMINE ASPARTATE, DEXTROAMPHETAMINE SULFATE AND AMPHETAMINE SULFATE 1.25; 1.25; 1.25; 1.25 MG/1; MG/1; MG/1; MG/1
TABLET ORAL
Qty: 120 TABLET | Refills: 0 | Status: SHIPPED | OUTPATIENT
Start: 2020-04-09 | End: 2020-07-07 | Stop reason: SDUPTHER

## 2020-07-01 ENCOUNTER — TELEPHONE (OUTPATIENT)
Dept: FAMILY MEDICINE CLINIC | Facility: CLINIC | Age: 25
End: 2020-07-01

## 2020-07-01 DIAGNOSIS — F98.8 ATTENTION DEFICIT DISORDER, UNSPECIFIED HYPERACTIVITY PRESENCE: ICD-10-CM

## 2020-07-01 DIAGNOSIS — F32.A DEPRESSION, UNSPECIFIED DEPRESSION TYPE: ICD-10-CM

## 2020-07-07 ENCOUNTER — OFFICE VISIT (OUTPATIENT)
Dept: FAMILY MEDICINE CLINIC | Facility: CLINIC | Age: 25
End: 2020-07-07

## 2020-07-07 VITALS
DIASTOLIC BLOOD PRESSURE: 76 MMHG | HEART RATE: 74 BPM | TEMPERATURE: 97.5 F | WEIGHT: 158 LBS | BODY MASS INDEX: 26.33 KG/M2 | SYSTOLIC BLOOD PRESSURE: 113 MMHG | HEIGHT: 65 IN

## 2020-07-07 DIAGNOSIS — F98.8 ATTENTION DEFICIT DISORDER, UNSPECIFIED HYPERACTIVITY PRESENCE: Primary | ICD-10-CM

## 2020-07-07 PROCEDURE — 99212 OFFICE O/P EST SF 10 MIN: CPT | Performed by: FAMILY MEDICINE

## 2020-07-07 RX ORDER — DEXTROAMPHETAMINE SACCHARATE, AMPHETAMINE ASPARTATE, DEXTROAMPHETAMINE SULFATE AND AMPHETAMINE SULFATE 1.25; 1.25; 1.25; 1.25 MG/1; MG/1; MG/1; MG/1
TABLET ORAL
Qty: 120 TABLET | Refills: 0 | Status: SHIPPED | OUTPATIENT
Start: 2020-07-07

## 2020-07-07 NOTE — PATIENT INSTRUCTIONS
Attention Deficit Hyperactivity Disorder, Adult  Attention deficit hyperactivity disorder (ADHD) is a mental health disorder that starts during childhood. For many people with ADHD, the disorder continues into adult years. There are many things that you and your health care provider or therapist (mental health professional) can do to manage your symptoms.  What are the causes?  The exact cause of ADHD is not known.  What increases the risk?  You are more likely to develop this condition if:  · You have a family history of ADHD.  · You are male.  · You were born to a mother who smoked or drank alcohol during pregnancy.  · You were exposed to lead poisoning or other toxins in the womb or in early life.  · You were born before 37 weeks of pregnancy (prematurely) or you had a low birth weight.  · You have experienced a brain injury.  What are the signs or symptoms?  Symptoms of this condition depend on the type of ADHD. The two main types are inattentive and hyperactive-impulsive. Some people may have symptoms of both types.  Symptoms of the inattentive type include:  · Difficulty watching, listening, or thinking with focused effort (paying attention).  · Making careless mistakes.  · Not listening.  · Not following instructions.  · Being disorganized.  · Avoiding tasks that require time and attention.  · Losing things.  · Forgetting things.  · Being easily distracted.  Symptoms of the hyperactive-impulsive type include:  · Restlessness.  · Talking too much.  · Interrupting.  · Difficulty with:  ? Sitting still.  ? Staying quiet.  ? Feeling motivated.  ? Relaxing.  ? Waiting in line or waiting for a turn.  How is this diagnosed?  This condition is diagnosed based on your current symptoms and your history of symptoms. The diagnosis can be made by a provider such as a primary care provider, psychiatrist, psychologist, or clinical . The provider may use a symptom checklist or a standardized behavior rating  scale to evaluate your symptoms. He or she may want to talk with family members who have known you for a long time and have observed your behaviors.  There are no lab tests or brain imaging tests that can diagnose ADHD.  How is this treated?  This condition can be treated with medicines and behavior therapy. Medicines may be the best option to reduce impulsive behaviors and improve attention. Your health care provider may recommend:  · Stimulant medicines. These are the most common medicines used for adult ADHD. They affect certain chemicals in the brain (neurotransmitters). These medicines may be long-acting or short-acting. This will determine how often you need to take the medicine.  · A non-stimulant medicine for adult ADHD (atomoxetine). This medicine increases a neurotransmitter called norepinephrine. It may take weeks to months to see effects from this medicine.  Psychotherapy and behavioral management are also important for treating ADHD. Psychotherapy is often used along with medicine. Your health care provider may suggest:  · Cognitive behavioral therapy (CBT). This type of therapy teaches you to replace negative thoughts and actions with positive thoughts and actions. When used as part of ADHD treatment, this therapy may also include:  ? Coping strategies for organization, time management, impulse control, and stress reduction.  ? Mindfulness and meditation training.  · Behavioral management. This may include strategies for organization and time management. You may work with an ADHD  who is specially trained to help people with ADHD to manage and organize activities and to function more effectively.  Follow these instructions at home:  Medicines    · Take over-the-counter and prescription medicines only as told by your health care provider.  · Talk with your health care provider about the possible side effects of your medicine to watch for.  General instructions    · Learn as much as you can about  adult ADHD, and work closely with your health care providers to find the treatments that work best for you.  · Do not use drugs or abuse alcohol. Limit alcohol intake to no more than 1 drink a day for nonpregnant women and 2 drinks a day for men. One drink equals 12 oz of beer, 5 oz of wine, or 1½ oz of hard liquor.  · Follow the same schedule each day. Make sure your schedule includes enough time for you to get plenty of sleep.  · Use reminder devices like notes, calendars, and phone apps to stay on-time and organized.  · Eat a healthy diet. Do not skip meals.  · Exercise regularly. Exercise can help to reduce stress and anxiety.  · Keep all follow-up visits as told by your health care provider and therapist. This is important.  Where to find more information  · A health care provider may be able to recommend resources that are available online or over the phone. You could start with:  ? Attention Deficit Disorder Association (ADDA): www.add.org  ? [employer] (NIMH): www.nimh.nih.gov  Contact a health care provider if:  · Your symptoms are changing, getting worse, or not improving.  · You have side effects from your medicine, such as:  ? Repeated muscle twitches, coughing, or speech outbursts.  ? Sleep problems.  ? Loss of appetite.  ? Depression.  ? New or worsening behavior problems.  ? Dizziness.  ? Unusually fast heartbeat.  ? Stomach pains.  ? Headaches.  · You are struggling with anxiety, depression, or substance abuse.  Get help right away if:  · You have a severe reaction to a medicine.  · You have thoughts of hurting yourself or others.  If you ever feel like you may hurt yourself or others, or have thoughts about taking your own life, get help right away. You can go to the nearest emergency department or call:  · Your local emergency services (911 in the [place]  · A suicide crisis helpline, such as the National Suicide Prevention Lifeline at 1-591.360.8045. This is open 24 hours a  day.  Summary  · ADHD is a mental health disorder that starts during childhood and often continues into adult years.  · The exact cause of ADHD is not known.  · There is no cure for ADHD, but treatment with medicine, therapy, or behavioral training can help you manage your condition.  This information is not intended to replace advice given to you by your health care provider. Make sure you discuss any questions you have with your health care provider.  Document Released: 08/09/2018 Document Revised: 11/30/2018 Document Reviewed: 08/09/2018  Elsevier Patient Education © 2020 [employer]

## 2020-07-07 NOTE — PROGRESS NOTES
"Subjective   Chief Complaint:   Chief Complaint   Patient presents with   • ADD         History of Present Illness today to refill Adderall 5 mg 2 tablets in a.m. and 2 tablets in the afternoon for ADD.  Patient is doing good on this dose.  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  Doing well on the above dose of Adderall without any side effects.  We will get a refill the Adderall 5 mg 2 tablets in the a.m. and 2 tablets in the afternoon.  And so then I will see her again in 3 months and will refill the Adderall 5 mg. Depression resolved.        Past Medical History:   Diagnosis Date   • Anxiety    • Attention deficit disorder (ADD)    • Depression    • Fatigue    • H/O bone density study    • Mononucleosis    • Routine eye exam utd        Adina Trinh 25 y.o. female who presents today for Medical Management of the below listed issues and medication refills.    ICD-10-CM ICD-9-CM   1. Attention deficit disorder, unspecified hyperactivity presence F98.8 314.00   2. Depression, unspecified depression type F32.9 311        she has a problem list of   Patient Active Problem List   Diagnosis   • ADD (attention deficit disorder)   .    she has been compliant with   Current Outpatient Medications:   •  amphetamine-dextroamphetamine (ADDERALL) 5 MG tablet, Take two tablets in the AM and two  Tablets  in the afternoon, Disp: 120 tablet, Rfl: 0  •  azithromycin (ZITHROMAX) 250 MG tablet, Take 2 tablets the first day, then 1 tablet daily for 4 days., Disp: 6 tablet, Rfl: 0  •  valACYclovir (VALTREX) 500 MG tablet, TK 1 T PO BID FOR 3 DAYS, Disp: , Rfl: .  she denies medication side effects.        /76   Pulse 74   Temp 97.5 °F (36.4 °C)   Ht 165.1 cm (65\")   Wt 71.7 kg (158 lb)   BMI 26.29 kg/m²         The following portions of the patient's history were reviewed and updated as appropriate: allergies, current medications, past family history, past medical history, past social history, past surgical history and " problem list.      she has a history of   Patient Active Problem List   Diagnosis   • ADD (attention deficit disorder)       Review of Systems   Constitutional: Negative for fatigue.   HENT: Negative for sinus pressure and sinus pain.    Eyes: Negative for visual disturbance.   Respiratory: Negative for chest tightness.    Cardiovascular: Negative for chest pain.   Gastrointestinal: Negative for abdominal pain.   Musculoskeletal: Negative for back pain.   Neurological: Negative for headaches.   Psychiatric/Behavioral: Negative for confusion.       Objective   Physical Exam   Constitutional: She is oriented to person, place, and time. She appears well-developed and well-nourished. No distress.   Eyes: Pupils are equal, round, and reactive to light.   Neck: No thyromegaly present.   Cardiovascular: Normal rate and regular rhythm.   Pulmonary/Chest: Effort normal. She has no wheezes. She has no rales.   Abdominal: Soft.   Musculoskeletal: Normal range of motion.   Neurological: She is alert and oriented to person, place, and time.   Psychiatric: She has a normal mood and affect. Her behavior is normal. Judgment and thought content normal.       Assessment/Plan   Adina was seen today for add.    Diagnoses and all orders for this visit:    Attention deficit disorder, unspecified hyperactivity presence  -     amphetamine-dextroamphetamine (ADDERALL) 5 MG tablet; Take two tablets in the AM and two  Tablets  in the afternoon

## 2021-04-16 ENCOUNTER — BULK ORDERING (OUTPATIENT)
Dept: CASE MANAGEMENT | Facility: OTHER | Age: 26
End: 2021-04-16

## 2021-04-16 DIAGNOSIS — Z23 IMMUNIZATION DUE: ICD-10-CM
